# Patient Record
Sex: FEMALE | Race: WHITE | NOT HISPANIC OR LATINO | Employment: FULL TIME | URBAN - METROPOLITAN AREA
[De-identification: names, ages, dates, MRNs, and addresses within clinical notes are randomized per-mention and may not be internally consistent; named-entity substitution may affect disease eponyms.]

---

## 2020-11-30 ENCOUNTER — TRANSCRIBE ORDERS (OUTPATIENT)
Dept: ADMINISTRATIVE | Facility: HOSPITAL | Age: 70
End: 2020-11-30

## 2020-11-30 DIAGNOSIS — Z78.0 POST-MENOPAUSAL: ICD-10-CM

## 2020-11-30 DIAGNOSIS — Z12.31 ENCOUNTER FOR SCREENING MAMMOGRAM FOR MALIGNANT NEOPLASM OF BREAST: Primary | ICD-10-CM

## 2020-12-07 ENCOUNTER — HOSPITAL ENCOUNTER (OUTPATIENT)
Dept: RADIOLOGY | Facility: CLINIC | Age: 70
Discharge: HOME | End: 2020-12-07
Payer: COMMERCIAL

## 2020-12-07 VITALS — BODY MASS INDEX: 30.82 KG/M2 | HEIGHT: 60 IN | WEIGHT: 157 LBS

## 2020-12-07 DIAGNOSIS — Z78.0 POST-MENOPAUSAL: ICD-10-CM

## 2020-12-07 DIAGNOSIS — Z12.31 ENCOUNTER FOR SCREENING MAMMOGRAM FOR MALIGNANT NEOPLASM OF BREAST: ICD-10-CM

## 2020-12-07 PROCEDURE — 77063 BREAST TOMOSYNTHESIS BI: CPT

## 2020-12-07 PROCEDURE — 77067 SCR MAMMO BI INCL CAD: CPT

## 2020-12-07 PROCEDURE — 77080 DXA BONE DENSITY AXIAL: CPT

## 2022-02-11 ENCOUNTER — HOSPITAL ENCOUNTER (OUTPATIENT)
Dept: RADIOLOGY | Facility: CLINIC | Age: 72
Discharge: HOME | End: 2022-02-11
Payer: COMMERCIAL

## 2022-02-11 VITALS — HEIGHT: 60 IN | WEIGHT: 152 LBS | BODY MASS INDEX: 29.84 KG/M2

## 2022-02-11 DIAGNOSIS — Z12.31 ENCOUNTER FOR SCREENING MAMMOGRAM FOR MALIGNANT NEOPLASM OF BREAST: ICD-10-CM

## 2022-02-11 PROCEDURE — 77067 SCR MAMMO BI INCL CAD: CPT

## 2022-02-11 PROCEDURE — 77063 BREAST TOMOSYNTHESIS BI: CPT

## 2022-02-15 DIAGNOSIS — Z12.31 ENCOUNTER FOR SCREENING MAMMOGRAM FOR MALIGNANT NEOPLASM OF BREAST: ICD-10-CM

## 2022-08-01 ENCOUNTER — HOSPITAL ENCOUNTER (OUTPATIENT)
Dept: RADIOLOGY | Facility: CLINIC | Age: 72
Discharge: HOME | End: 2022-08-01
Payer: COMMERCIAL

## 2022-08-01 DIAGNOSIS — R60.0 LOCALIZED EDEMA: ICD-10-CM

## 2022-08-01 DIAGNOSIS — R06.02 SOB (SHORTNESS OF BREATH): ICD-10-CM

## 2022-08-01 DIAGNOSIS — R00.0 TACHYCARDIA: ICD-10-CM

## 2022-08-01 PROCEDURE — 71046 X-RAY EXAM CHEST 2 VIEWS: CPT

## 2022-08-11 ENCOUNTER — HOSPITAL ENCOUNTER (INPATIENT)
Facility: HOSPITAL | Age: 72
LOS: 3 days | Discharge: ANOTHER HOSPITAL | DRG: 291 | End: 2022-08-14
Attending: EMERGENCY MEDICINE | Admitting: FAMILY MEDICINE
Payer: COMMERCIAL

## 2022-08-11 ENCOUNTER — APPOINTMENT (INPATIENT)
Dept: CT IMAGING | Facility: HOSPITAL | Age: 72
DRG: 291 | End: 2022-08-11
Payer: COMMERCIAL

## 2022-08-11 ENCOUNTER — APPOINTMENT (EMERGENCY)
Dept: NON INVASIVE DIAGNOSTICS | Facility: HOSPITAL | Age: 72
DRG: 291 | End: 2022-08-11
Payer: COMMERCIAL

## 2022-08-11 ENCOUNTER — APPOINTMENT (EMERGENCY)
Dept: RADIOLOGY | Facility: HOSPITAL | Age: 72
DRG: 291 | End: 2022-08-11
Payer: COMMERCIAL

## 2022-08-11 DIAGNOSIS — I50.9 CONGESTIVE HEART FAILURE (CHF) (HCC): ICD-10-CM

## 2022-08-11 DIAGNOSIS — I50.9 HEART FAILURE (HCC): Primary | ICD-10-CM

## 2022-08-11 PROBLEM — I50.41 ACUTE COMBINED SYSTOLIC AND DIASTOLIC CONGESTIVE HEART FAILURE (HCC): Status: ACTIVE | Noted: 2022-08-11

## 2022-08-11 PROBLEM — Z72.0 TOBACCO ABUSE: Status: ACTIVE | Noted: 2022-08-11

## 2022-08-11 LAB
2HR DELTA HS TROPONIN: -1 NG/L
4HR DELTA HS TROPONIN: 0 NG/L
ALBUMIN SERPL BCP-MCNC: 3.3 G/DL (ref 3.5–5)
ALP SERPL-CCNC: 85 U/L (ref 46–116)
ALT SERPL W P-5'-P-CCNC: 40 U/L (ref 12–78)
ANION GAP SERPL CALCULATED.3IONS-SCNC: 11 MMOL/L (ref 4–13)
AORTIC ROOT: 3.3 CM
AORTIC VALVE MEAN VELOCITY: 4.2 M/S
APICAL FOUR CHAMBER EJECTION FRACTION: 17 %
ASCENDING AORTA: 3.1 CM
AST SERPL W P-5'-P-CCNC: 21 U/L (ref 5–45)
AV LVOT MEAN GRADIENT: 0 MMHG
AV LVOT PEAK GRADIENT: 1 MMHG
AV MEAN GRADIENT: 1 MMHG
AV PEAK GRADIENT: 2 MMHG
AV VELOCITY RATIO: 0.68
BASOPHILS # BLD AUTO: 0.06 THOUSANDS/ΜL (ref 0–0.1)
BASOPHILS NFR BLD AUTO: 1 % (ref 0–1)
BILIRUB SERPL-MCNC: 0.68 MG/DL (ref 0.2–1)
BUN SERPL-MCNC: 14 MG/DL (ref 5–25)
CALCIUM ALBUM COR SERPL-MCNC: 9.2 MG/DL (ref 8.3–10.1)
CALCIUM SERPL-MCNC: 8.6 MG/DL (ref 8.3–10.1)
CARDIAC TROPONIN I PNL SERPL HS: 16 NG/L
CARDIAC TROPONIN I PNL SERPL HS: 17 NG/L
CARDIAC TROPONIN I PNL SERPL HS: 17 NG/L
CHLORIDE SERPL-SCNC: 101 MMOL/L (ref 96–108)
CO2 SERPL-SCNC: 26 MMOL/L (ref 21–32)
CREAT SERPL-MCNC: 0.67 MG/DL (ref 0.6–1.3)
D DIMER PPP FEU-MCNC: 1.43 UG/ML FEU
DOP CALC AO PEAK VEL: 0.73 M/S
DOP CALC AO VTI: 9.69 CM
DOP CALC LVOT PEAK VEL VTI: 6.55 CM
DOP CALC LVOT PEAK VEL: 0.5 M/S
EOSINOPHIL # BLD AUTO: 0.14 THOUSAND/ΜL (ref 0–0.61)
EOSINOPHIL NFR BLD AUTO: 2 % (ref 0–6)
ERYTHROCYTE [DISTWIDTH] IN BLOOD BY AUTOMATED COUNT: 15.9 % (ref 11.6–15.1)
FRACTIONAL SHORTENING: 4 (ref 28–44)
GFR SERPL CREATININE-BSD FRML MDRD: 89 ML/MIN/1.73SQ M
GLUCOSE SERPL-MCNC: 103 MG/DL (ref 65–140)
HCT VFR BLD AUTO: 40.3 % (ref 34.8–46.1)
HGB BLD-MCNC: 12.7 G/DL (ref 11.5–15.4)
IMM GRANULOCYTES # BLD AUTO: 0.02 THOUSAND/UL (ref 0–0.2)
IMM GRANULOCYTES NFR BLD AUTO: 0 % (ref 0–2)
INTERVENTRICULAR SEPTUM IN DIASTOLE (PARASTERNAL SHORT AXIS VIEW): 0.8 CM
INTERVENTRICULAR SEPTUM: 0.8 CM (ref 0.6–1.1)
LAAS-AP2: 25.6 CM2
LAAS-AP4: 28.8 CM2
LEFT ATRIUM SIZE: 4.2 CM
LEFT INTERNAL DIMENSION IN SYSTOLE: 5.5 CM (ref 2.1–4)
LEFT VENTRICLE DIASTOLIC VOLUME (MOD BIPLANE): 162 ML
LEFT VENTRICLE SYSTOLIC VOLUME (MOD BIPLANE): 130 ML
LEFT VENTRICULAR INTERNAL DIMENSION IN DIASTOLE: 5.7 CM (ref 3.5–6)
LEFT VENTRICULAR POSTERIOR WALL IN END DIASTOLE: 0.9 CM
LEFT VENTRICULAR STROKE VOLUME: 13 ML
LV EF: 20 %
LVSV (TEICH): 13 ML
LYMPHOCYTES # BLD AUTO: 1.22 THOUSANDS/ΜL (ref 0.6–4.47)
LYMPHOCYTES NFR BLD AUTO: 18 % (ref 14–44)
MCH RBC QN AUTO: 27.4 PG (ref 26.8–34.3)
MCHC RBC AUTO-ENTMCNC: 31.5 G/DL (ref 31.4–37.4)
MCV RBC AUTO: 87 FL (ref 82–98)
MONOCYTES # BLD AUTO: 0.88 THOUSAND/ΜL (ref 0.17–1.22)
MONOCYTES NFR BLD AUTO: 13 % (ref 4–12)
MV E'TISSUE VEL-LAT: 5 CM/S
MV E'TISSUE VEL-SEP: 4 CM/S
MV STENOSIS PRESSURE HALF TIME: 38 MS
MV VALVE AREA P 1/2 METHOD: 5.79
NEUTROPHILS # BLD AUTO: 4.62 THOUSANDS/ΜL (ref 1.85–7.62)
NEUTS SEG NFR BLD AUTO: 66 % (ref 43–75)
NRBC BLD AUTO-RTO: 0 /100 WBCS
NT-PROBNP SERPL-MCNC: 7131 PG/ML
PISA MRMAX VEL: 0.29 M/S
PISA RADIUS: 0.9 CM
PLATELET # BLD AUTO: 262 THOUSANDS/UL (ref 149–390)
PLATELET # BLD AUTO: 270 THOUSANDS/UL (ref 149–390)
PMV BLD AUTO: 9.4 FL (ref 8.9–12.7)
PMV BLD AUTO: 9.5 FL (ref 8.9–12.7)
POTASSIUM SERPL-SCNC: 4.2 MMOL/L (ref 3.5–5.3)
PROT SERPL-MCNC: 6.6 G/DL (ref 6.4–8.4)
RBC # BLD AUTO: 4.64 MILLION/UL (ref 3.81–5.12)
SL CV LEFT ATRIUM LENGTH A2C: 5.8 CM
SL CV PED ECHO LEFT VENTRICLE DIASTOLIC VOLUME (MOD BIPLANE) 2D: 158 ML
SL CV PED ECHO LEFT VENTRICLE SYSTOLIC VOLUME (MOD BIPLANE) 2D: 146 ML
SODIUM SERPL-SCNC: 138 MMOL/L (ref 135–147)
TR MAX PG: 15 MMHG
TR PEAK VELOCITY: 1.9 M/S
TRICUSPID VALVE PEAK REGURGITATION VELOCITY: 1.92 M/S
WBC # BLD AUTO: 6.94 THOUSAND/UL (ref 4.31–10.16)

## 2022-08-11 PROCEDURE — 71275 CT ANGIOGRAPHY CHEST: CPT

## 2022-08-11 PROCEDURE — 93005 ELECTROCARDIOGRAM TRACING: CPT

## 2022-08-11 PROCEDURE — 36415 COLL VENOUS BLD VENIPUNCTURE: CPT | Performed by: EMERGENCY MEDICINE

## 2022-08-11 PROCEDURE — 85379 FIBRIN DEGRADATION QUANT: CPT | Performed by: EMERGENCY MEDICINE

## 2022-08-11 PROCEDURE — 71045 X-RAY EXAM CHEST 1 VIEW: CPT

## 2022-08-11 PROCEDURE — 99222 1ST HOSP IP/OBS MODERATE 55: CPT | Performed by: FAMILY MEDICINE

## 2022-08-11 PROCEDURE — 84484 ASSAY OF TROPONIN QUANT: CPT | Performed by: EMERGENCY MEDICINE

## 2022-08-11 PROCEDURE — 83880 ASSAY OF NATRIURETIC PEPTIDE: CPT | Performed by: EMERGENCY MEDICINE

## 2022-08-11 PROCEDURE — 93306 TTE W/DOPPLER COMPLETE: CPT

## 2022-08-11 PROCEDURE — 99285 EMERGENCY DEPT VISIT HI MDM: CPT | Performed by: EMERGENCY MEDICINE

## 2022-08-11 PROCEDURE — 85049 AUTOMATED PLATELET COUNT: CPT | Performed by: FAMILY MEDICINE

## 2022-08-11 PROCEDURE — G1004 CDSM NDSC: HCPCS

## 2022-08-11 PROCEDURE — 80053 COMPREHEN METABOLIC PANEL: CPT | Performed by: EMERGENCY MEDICINE

## 2022-08-11 PROCEDURE — 85025 COMPLETE CBC W/AUTO DIFF WBC: CPT | Performed by: EMERGENCY MEDICINE

## 2022-08-11 PROCEDURE — 99285 EMERGENCY DEPT VISIT HI MDM: CPT

## 2022-08-11 RX ORDER — ASPIRIN 81 MG/1
81 TABLET ORAL DAILY
Status: DISCONTINUED | OUTPATIENT
Start: 2022-08-12 | End: 2022-08-14 | Stop reason: HOSPADM

## 2022-08-11 RX ORDER — NICOTINE 21 MG/24HR
14 PATCH, TRANSDERMAL 24 HOURS TRANSDERMAL DAILY
Status: DISCONTINUED | OUTPATIENT
Start: 2022-08-12 | End: 2022-08-14 | Stop reason: HOSPADM

## 2022-08-11 RX ORDER — METOPROLOL SUCCINATE 25 MG/1
25 TABLET, EXTENDED RELEASE ORAL DAILY
Status: DISCONTINUED | OUTPATIENT
Start: 2022-08-11 | End: 2022-08-14 | Stop reason: HOSPADM

## 2022-08-11 RX ORDER — POTASSIUM CHLORIDE 20 MEQ/1
20 TABLET, EXTENDED RELEASE ORAL DAILY
Status: DISCONTINUED | OUTPATIENT
Start: 2022-08-11 | End: 2022-08-14

## 2022-08-11 RX ORDER — ACETAMINOPHEN 325 MG/1
650 TABLET ORAL EVERY 4 HOURS PRN
Status: DISCONTINUED | OUTPATIENT
Start: 2022-08-11 | End: 2022-08-14 | Stop reason: HOSPADM

## 2022-08-11 RX ORDER — FUROSEMIDE 10 MG/ML
40 INJECTION INTRAMUSCULAR; INTRAVENOUS
Status: DISCONTINUED | OUTPATIENT
Start: 2022-08-11 | End: 2022-08-12

## 2022-08-11 RX ORDER — ENOXAPARIN SODIUM 100 MG/ML
40 INJECTION SUBCUTANEOUS DAILY
Status: DISCONTINUED | OUTPATIENT
Start: 2022-08-12 | End: 2022-08-14 | Stop reason: HOSPADM

## 2022-08-11 RX ORDER — ASPIRIN 81 MG/1
162 TABLET, CHEWABLE ORAL ONCE
Status: COMPLETED | OUTPATIENT
Start: 2022-08-11 | End: 2022-08-11

## 2022-08-11 RX ADMIN — METOPROLOL SUCCINATE 25 MG: 25 TABLET, EXTENDED RELEASE ORAL at 15:51

## 2022-08-11 RX ADMIN — ASPIRIN 81 MG CHEWABLE TABLET 162 MG: 81 TABLET CHEWABLE at 15:51

## 2022-08-11 RX ADMIN — POTASSIUM CHLORIDE 20 MEQ: 1500 TABLET, EXTENDED RELEASE ORAL at 15:52

## 2022-08-11 RX ADMIN — IOHEXOL 74 ML: 350 INJECTION, SOLUTION INTRAVENOUS at 16:24

## 2022-08-11 RX ADMIN — FUROSEMIDE 40 MG: 10 INJECTION, SOLUTION INTRAMUSCULAR; INTRAVENOUS at 15:52

## 2022-08-11 NOTE — PLAN OF CARE
Problem: Potential for Falls  Goal: Patient will remain free of falls  Description: INTERVENTIONS:  - Educate patient/family on patient safety including physical limitations  - Instruct patient to call for assistance with activity   - Consult OT/PT to assist with strengthening/mobility   - Keep Call bell within reach  - Keep bed low and locked with side rails adjusted as appropriate  - Keep care items and personal belongings within reach  - Initiate and maintain comfort rounds  - Make Fall Risk Sign visible to staff  - Offer Toileting every 2 Hours, in advance of need  - Initiate/Maintain bed alarm  - Obtain necessary fall risk management equipment: q shift   - Apply yellow socks and bracelet for high fall risk patients  - Consider moving patient to room near nurses station  Outcome: Progressing     Problem: PAIN - ADULT  Goal: Verbalizes/displays adequate comfort level or baseline comfort level  Description: Interventions:  - Encourage patient to monitor pain and request assistance  - Assess pain using appropriate pain scale  - Administer analgesics based on type and severity of pain and evaluate response  - Implement non-pharmacological measures as appropriate and evaluate response  - Consider cultural and social influences on pain and pain management  - Notify physician/advanced practitioner if interventions unsuccessful or patient reports new pain  Outcome: Progressing     Problem: INFECTION - ADULT  Goal: Absence or prevention of progression during hospitalization  Description: INTERVENTIONS:  - Assess and monitor for signs and symptoms of infection  - Monitor lab/diagnostic results  - Monitor all insertion sites, i e  indwelling lines, tubes, and drains  - Monitor endotracheal if appropriate and nasal secretions for changes in amount and color  - Dawson appropriate cooling/warming therapies per order  - Administer medications as ordered  - Instruct and encourage patient and family to use good hand hygiene technique  - Identify and instruct in appropriate isolation precautions for identified infection/condition  Outcome: Progressing  Goal: Absence of fever/infection during neutropenic period  Description: INTERVENTIONS:  - Monitor WBC    Outcome: Progressing     Problem: SAFETY ADULT  Goal: Patient will remain free of falls  Description: INTERVENTIONS:  - Educate patient/family on patient safety including physical limitations  - Instruct patient to call for assistance with activity   - Consult OT/PT to assist with strengthening/mobility   - Keep Call bell within reach  - Keep bed low and locked with side rails adjusted as appropriate  - Keep care items and personal belongings within reach  - Initiate and maintain comfort rounds  - Make Fall Risk Sign visible to staff  - Offer Toileting every 2 Hours, in advance of need  - Initiate/Maintain bed alarm  - Obtain necessary fall risk management equipment: q shift   - Apply yellow socks and bracelet for high fall risk patients  - Consider moving patient to room near nurses station  Outcome: Progressing  Goal: Maintain or return to baseline ADL function  Description: INTERVENTIONS:  -  Assess patient's ability to carry out ADLs; assess patient's baseline for ADL function and identify physical deficits which impact ability to perform ADLs (bathing, care of mouth/teeth, toileting, grooming, dressing, etc )  - Assess/evaluate cause of self-care deficits   - Assess range of motion  - Assess patient's mobility; develop plan if impaired  - Assess patient's need for assistive devices and provide as appropriate  - Encourage maximum independence but intervene and supervise when necessary  - Involve family in performance of ADLs  - Assess for home care needs following discharge   - Consider OT consult to assist with ADL evaluation and planning for discharge  - Provide patient education as appropriate  Outcome: Progressing  Goal: Maintains/Returns to pre admission functional level  Description: INTERVENTIONS:  - Perform BMAT or MOVE assessment daily    - Set and communicate daily mobility goal to care team and patient/family/caregiver  - Collaborate with rehabilitation services on mobility goals if consulted  - Perform Range of Motion 4 times a day  - Reposition patient every 2 hours  - Dangle patient 3 times a day  - Stand patient 3 times a day  - Ambulate patient 3 times a day  - Out of bed to chair 3 times a day   - Out of bed for meals 3 times a day  - Out of bed for toileting  - Record patient progress and toleration of activity level   Outcome: Progressing     Problem: DISCHARGE PLANNING  Goal: Discharge to home or other facility with appropriate resources  Description: INTERVENTIONS:  - Identify barriers to discharge w/patient and caregiver  - Arrange for needed discharge resources and transportation as appropriate  - Identify discharge learning needs (meds, wound care, etc )  - Arrange for interpretive services to assist at discharge as needed  - Refer to Case Management Department for coordinating discharge planning if the patient needs post-hospital services based on physician/advanced practitioner order or complex needs related to functional status, cognitive ability, or social support system  Outcome: Progressing     Problem: Knowledge Deficit  Goal: Patient/family/caregiver demonstrates understanding of disease process, treatment plan, medications, and discharge instructions  Description: Complete learning assessment and assess knowledge base    Interventions:  - Provide teaching at level of understanding  - Provide teaching via preferred learning methods  Outcome: Progressing     Problem: CARDIOVASCULAR - ADULT  Goal: Maintains optimal cardiac output and hemodynamic stability  Description: INTERVENTIONS:  - Monitor I/O, vital signs and rhythm  - Monitor for S/S and trends of decreased cardiac output  - Administer and titrate ordered vasoactive medications to optimize hemodynamic stability  - Assess quality of pulses, skin color and temperature  - Assess for signs of decreased coronary artery perfusion  - Instruct patient to report change in severity of symptoms  Outcome: Progressing  Goal: Absence of cardiac dysrhythmias or at baseline rhythm  Description: INTERVENTIONS:  - Continuous cardiac monitoring, vital signs, obtain 12 lead EKG if ordered  - Administer antiarrhythmic and heart rate control medications as ordered  - Monitor electrolytes and administer replacement therapy as ordered  Outcome: Progressing     Problem: RESPIRATORY - ADULT  Goal: Achieves optimal ventilation and oxygenation  Description: INTERVENTIONS:  - Assess for changes in respiratory status  - Assess for changes in mentation and behavior  - Position to facilitate oxygenation and minimize respiratory effort  - Oxygen administered by appropriate delivery if ordered  - Initiate smoking cessation education as indicated  - Encourage broncho-pulmonary hygiene including cough, deep breathe, Incentive Spirometry  - Assess the need for suctioning and aspirate as needed  - Assess and instruct to report SOB or any respiratory difficulty  - Respiratory Therapy support as indicated  Outcome: Progressing

## 2022-08-11 NOTE — H&P
[address]  H&P- Scott Moots 1950, 81 y o  female MRN: [id number]  Unit/Bed#: -[facility] Encounter: [id number]  Primary Care Provider: Philippe Jacques PA-C   Date and time admitted to hospital: 8/11/2022  2:44 PM    * Acute combined systolic and diastolic congestive heart failure ([address] )  Assessment & Plan  Wt Readings from Last 3 Encounters:   08/11/22 65 8 kg (145 lb 1 oz)   02/11/22 68 9 kg (152 lb)   12/07/20 71 2 kg (157 lb)       · Shortness of breath has been worsening, orthopnea worsening lower extremity edema Lasix 20 mg outpatient that was recently started not help  Severe cardiomyopathy on the echo done bedside and read officially with right-sided the heart failure as well-Left Ventricle: Normal left ventricular wall thickness with enlarged LV cavity size and severely reduced LV systolic function  Estimated ejection fraction 10-15% with severe global hypokinesis  Abnormal diastolic relaxation pattern suggestive of restrictive inflow  · Will place on telemetry monitor  · Troponins are negative no chest pain  · Evaluated by Cardiology medical therapy has been started with aspirin and Toprol Lasix 40 mg IV b i d   · Patient will need cardiac catheterization after diuresis prefers to be transferred to [facility] anticipate Monday  · One thousand five hundred fluid restricted strict I and o and daily weight      Tobacco abuse  Assessment & Plan  · Just quit place on nicotine patch    VTE Pharmacologic Prophylaxis: VTE Score: 4 Moderate Risk (Score 3-4) - Pharmacological DVT Prophylaxis Ordered: enoxaparin (Lovenox)  Code Status: Level 1 - Full Code   Discussion with family: Updated  (sister) at bedside  Anticipated Length of Stay: Patient will be admitted on an inpatient basis with an anticipated length of stay of greater than 2 midnights secondary to New onset CHF exacerbation      Total Time for Visit, including Counseling / Coordination of Care: 60 minutes Greater than 50% of this total time spent on direct patient counseling and coordination of care  Chief Complaint: sob     History of Present Illness: Zarina Phoenix is a 81 y o  female with a PMH of tobacco abuse who presents with sob x1 year , sob worse for 3 weeks exertion, leg swelling has been given lasix 20 mg tabs by pcp first week of august, no change  Orthopnea , positive pnd  Drinking a lot of water diet ok not l;ots of salt  Cough gone  No chest pain  Quit smoking July 31, smoked 1/2-1ppd  No gi symptoms  Review of Systems:  Review of Systems   Constitutional: Negative for chills and fever  HENT: Negative for ear pain and sore throat  Eyes: Negative for pain and visual disturbance  Respiratory: Positive for shortness of breath  Negative for cough  Cardiovascular: Positive for leg swelling  Negative for chest pain and palpitations  Gastrointestinal: Negative for abdominal pain and vomiting  Genitourinary: Negative for dysuria and hematuria  Musculoskeletal: Negative for arthralgias and back pain  Skin: Negative for color change and rash  Neurological: Negative for seizures and syncope  All other systems reviewed and are negative  Past Medical and Surgical History:   Past Medical History:   Diagnosis Date    Basal cell carcinoma (BCC) of face     age 74    [address]F (congestive heart failure) ([facility] [address] )        Past Surgical History:   Procedure Laterality Date    OOPHORECTOMY Right     2009       Meds/Allergies:  Prior to Admission medications    Not on File     I have reviewed home medications with patient personally  Allergies:    Allergies   Allergen Reactions    Atorvastatin Other (See Comments)     Tired, muscle pain, "trouble breathing"        Social History:  Marital Status:    Substance Use History:   Social History     Substance and Sexual Activity   Alcohol Use Not Currently     Social History     Tobacco Use   Smoking Status Former Smoker    Packs/day: 1 00  Years: 40 00    Pack years: 40 00    Quit date: 2022    Years since quittin 0   Smokeless Tobacco Never Used     Social History     Substance and Sexual Activity   Drug Use Never       Family History:  Family History   Problem Relation Age of Onset    No Known Problems Mother     No Known Problems Father     No Known Problems Daughter     No Known Problems Maternal Grandmother     No Known Problems Maternal Grandfather     No Known Problems Paternal Grandmother     No Known Problems Paternal Grandfather     No Known Problems Half-Sister     No Known Problems Half-Sister     No Known Problems Maternal Aunt     No Known Problems Maternal Aunt     No Known Problems Paternal Aunt     No Known Problems Paternal Aunt        Physical Exam:     Vitals:   Blood Pressure: 122/88 (22 1641)  Pulse: 98 (22 1641)  Temperature: 97 5 °F (36 4 °C) (22 1641)  Temp Source: Temporal (22 1437)  Respirations: (!) 28 (22 1545)  Height: 5' 1" (154 9 cm) (22 1636)  Weight - Scale: 65 8 kg (145 lb 1 oz) (22 1636)  SpO2: 96 % (22 1641)    Physical Exam  Vitals and nursing note reviewed  Constitutional:       General: She is not in acute distress  Appearance: She is well-developed  HENT:      Head: Normocephalic and atraumatic  Eyes:      Conjunctiva/sclera: Conjunctivae normal    Cardiovascular:      Rate and Rhythm: Normal rate and regular rhythm  Heart sounds: No murmur heard  Pulmonary:      Effort: Pulmonary effort is normal  No respiratory distress  Breath sounds: Rales present  Abdominal:      General: There is no distension  Palpations: Abdomen is soft  Tenderness: There is no abdominal tenderness  Musculoskeletal:         General: Swelling present  Cervical back: Neck supple  Skin:     General: Skin is warm and dry  Neurological:      Mental Status: She is alert and oriented to person, place, and time  Additional Data:     Lab Results:  Results from last 7 days   Lab Units 08/11/22  1459   WBC Thousand/uL 6 94   HEMOGLOBIN g/dL 12 7   HEMATOCRIT % 40 3   PLATELETS Thousands/uL 262   NEUTROS PCT % 66   LYMPHS PCT % 18   MONOS PCT % 13*   EOS PCT % 2     Results from last 7 days   Lab Units 08/11/22  1459   SODIUM mmol/L 138   POTASSIUM mmol/L 4 2   CHLORIDE mmol/L 101   CO2 mmol/L 26   BUN mg/dL 14   CREATININE mg/dL 0 67   ANION GAP mmol/L 11   CALCIUM mg/dL 8 6   ALBUMIN g/dL 3 3*   TOTAL BILIRUBIN mg/dL 0 68   ALK PHOS U/L 85   ALT U/L 40   AST U/L 21   GLUCOSE RANDOM mg/dL 103                       Imaging: Reviewed radiology reports from this admission including: chest xray and chest CT scan  CTA ED chest PE Study   Final Result by Александр Olmos MD (08/11 1650)      No pulmonary embolus  Mild interstitial edema with trace right effusion  Mild emphysema  Cardiomegaly  Workstation performed: [id number]         XR chest 1 view portable    (Results Pending)   VAS lower limb venous duplex study, complete bilateral    (Results Pending)       EKG and Other Studies Reviewed on Admission:   · EKG: No EKG obtained  will obtain    ** Please Note: This note has been constructed using a voice recognition system   **

## 2022-08-11 NOTE — CONSULTS
Consultation - Cardiology   Jonathan Lefort 81 y o  female MRN: [id number]  Unit/Bed#: ED 03 Encounter: [id number]    Assessment/Plan     Assessment:  Acute on chronic HFrEF- visually based on echo at bedside, not officially read, edema, sob  Cardiomyopathy- unknown etiology  Smoking- 40 pack year history but quit a few weeks ago     Plan:  1  Start lasix 40 IV BID  2  Monitor I and O, daily standing weights, renal function and electrolytes  3  Recommend diuresis first and then would recommend transfer to [facility] for [address]  4  Will formally read echocardiogram    5  Start asa 81 mg daily  6  Start Toprol xl 25 mg daily  7  Will eventually recommend statin  8  Pt has a wedding tomorrow  She wants to leave tomorrow knowing this is against medical advice and then would like to come back  Would recommend diuresis through the weekend and then transfer to [facility] for [address]  Will contact [facility] to let them know  History of Present Illness   Physician Requesting Consult: Milton Merritt MD  Reason for Consult / Principal Problem: CHFrEF  HPI: Jonathan Lefort is a 81y o  year old female with history of smoking is here for concerns with sob and edema  Recently saw PCP at the beginning of august for these concerns  She eventually was placed on lasix and potassium on 8/2/22 as well as an inhaler  She states this did help her symptoms but her symptoms did not resolve  She states her breathing has been difficult for "a long time", likely years  She states this got more significant recently  She does not feel sob resting but does notice SOB with just speaking  She has not noticed fevers or chills  She states she "was coughing" but hasnt because she quit on July 31st  She states she was smoking a pack a day for 40 years  Throughout this episode she has not had any chest pain  She states that she does not regulalry see a doctor but did get regular physicals  She reports no change in appetite and no weight change lately       At the time of seeing her PCP her BNP was 9521  She works at [employer] and has been taking days off since her symptoms started  She states she cannot stay in the hospital because her daughter is getting   Reports no other drug use or alcohol use  In the past hasnt tolerated statin  Consults    Review of Systems   Constitutional: Positive for fatigue  Negative for chills, diaphoresis and unexpected weight change  Respiratory: Positive for cough and shortness of breath  Negative for chest tightness  Cardiovascular: Positive for leg swelling  Negative for chest pain and palpitations  Gastrointestinal: Negative for nausea  Musculoskeletal: Negative for arthralgias  Skin: Negative for color change, pallor and rash  Neurological: Positive for weakness  Negative for dizziness and light-headedness  Psychiatric/Behavioral: Negative for agitation, behavioral problems and confusion  Historical Information   Past Medical History:   Diagnosis Date    Basal cell carcinoma (BCC) of face     age 74    CHF (congestive heart failure) ([facility])      Past Surgical History:   Procedure Laterality Date    OOPHORECTOMY Right     2009     Social History     Substance and Sexual Activity   Alcohol Use Not Currently     Social History     Substance and Sexual Activity   Drug Use Never     E-Cigarette/Vaping     E-Cigarette/Vaping Substances     Social History     Tobacco Use   Smoking Status Former Smoker    Packs/day: 1 00    Years: 40 00    Pack years: 40 00    Quit date: 2022    Years since quittin 0   Smokeless Tobacco Never Used     Family History: non-contributory    Meds/Allergies   all current active meds have been reviewed  Allergies   Allergen Reactions    Atorvastatin Other (See Comments)     Tired, muscle pain, "trouble breathing"        Objective   Vitals: Blood pressure 126/82, pulse 99, temperature 97 5 °F (36 4 °C), temperature source Temporal, resp   rate 18, weight 65 8 kg (145 lb), SpO2 95 %  Orthostatic Blood Pressures    Flowsheet Row Most Recent Value   Blood Pressure 126/82 filed at 08/11/2022 1500   Patient Position - Orthostatic VS Lying filed at 08/11/2022 1500          No intake or output data in the 24 hours ending 08/11/22 1513    Invasive Devices  Report    Peripheral Intravenous Line  Duration           Peripheral IV 08/11/22 Left Antecubital <1 day                Physical Exam  Constitutional:       Appearance: Normal appearance  HENT:      Head: Normocephalic and atraumatic  Cardiovascular:      Rate and Rhythm: Normal rate  Heart sounds: No murmur heard  No gallop  Pulmonary:      Effort: Pulmonary effort is normal       Comments: Decreased breath sounds in bases  Abdominal:      Palpations: Abdomen is soft  Musculoskeletal:         General: Swelling present  Cervical back: Neck supple  Skin:     General: Skin is warm and dry  Capillary Refill: Capillary refill takes less than 2 seconds  Neurological:      General: No focal deficit present  Mental Status: She is alert and oriented to person, place, and time  Psychiatric:         Mood and Affect: Mood normal          Thought Content: Thought content normal          Lab Results:   I have personally reviewed pertinent lab results  CBC with diff:   Results from last 7 days   Lab Units 08/11/22  1459   WBC Thousand/uL 6 94   RBC Million/uL 4 64   HEMOGLOBIN g/dL 12 7   HEMATOCRIT % 40 3   MCV fL 87   MCH pg 27 4   MCHC g/dL 31 5   RDW % 15 9*   MPV fL 9 4   PLATELETS Thousands/uL 262     CMP:       Invalid input(s): ALBUMIN  HS Troponin: No results found for: HSTNI, HSTNI0, HSTNI2, HSTNI4  BNP:     Coags:     TSH:     Magnesium:     Lipid Profile:     Imaging: I have personally reviewed pertinent reports  EKG: nsr with LAFB    Estimated LVEF 15% at bedside, awaiting formal reading

## 2022-08-11 NOTE — ED PROVIDER NOTES
History  Chief Complaint   Patient presents with    Shortness of Breath     PT sent from office, provider had her do ambulating SPO2 said it "dropped" but did not tell a number  PT complains of SOB with exertion  History provided by:  Medical records, patient and relative (Sister)  Shortness of Breath  Severity:  Moderate  Onset quality:  Gradual  Timing:  Constant  Progression:  Unchanged  Chronicity:  New  Context comment:  Patient has been dealing with several weeks of dyspnea on exertion and some leg swelling, seen by PCP 1 week ago for similar told she potentially had congestive heart failure, was placed on Lasix and potassium, minimal improvement  Repeat evaluation today  Relieved by:  Nothing  Worsened by:  Exertion  Ineffective treatments:  None tried  Associated symptoms: no abdominal pain, no chest pain, no cough, no ear pain, no fever, no headaches, no rash, no sore throat and no vomiting    Risk factors comment:  Smoker quit 2 weeks ago      None       Past Medical History:   Diagnosis Date    Basal cell carcinoma (BCC) of face     age 74    CHF (congestive heart failure) (HCC)        Past Surgical History:   Procedure Laterality Date    OOPHORECTOMY Right     2009       Family History   Problem Relation Age of Onset    No Known Problems Mother     No Known Problems Father     No Known Problems Daughter     No Known Problems Maternal Grandmother     No Known Problems Maternal Grandfather     No Known Problems Paternal Grandmother     No Known Problems Paternal Grandfather     No Known Problems Half-Sister     No Known Problems Half-Sister     No Known Problems Maternal Aunt     No Known Problems Maternal Aunt     No Known Problems Paternal Aunt     No Known Problems Paternal Aunt      I have reviewed and agree with the history as documented      E-Cigarette/Vaping     E-Cigarette/Vaping Substances     Social History     Tobacco Use    Smoking status: Former Smoker     Packs/day: 1 00     Years: 40 00     Pack years: 40 00     Quit date: 2022     Years since quittin 0    Smokeless tobacco: Never Used   Substance Use Topics    Alcohol use: Not Currently    Drug use: Never       Review of Systems   Constitutional: Negative for chills, fatigue and fever  HENT: Negative for ear discharge, ear pain, rhinorrhea and sore throat  Eyes: Negative for pain and visual disturbance  Respiratory: Positive for shortness of breath  Negative for cough  Cardiovascular: Positive for leg swelling  Negative for chest pain and palpitations  Gastrointestinal: Negative for abdominal pain, diarrhea, nausea and vomiting  Endocrine: Negative for polydipsia, polyphagia and polyuria  Genitourinary: Negative for difficulty urinating, dysuria, flank pain and hematuria  Musculoskeletal: Negative for arthralgias and back pain  Skin: Negative for color change and rash  Allergic/Immunologic: Negative for immunocompromised state  Neurological: Negative for dizziness, seizures, syncope, weakness and headaches  Psychiatric/Behavioral: Negative for confusion and self-injury  The patient is not nervous/anxious  All other systems reviewed and are negative  Physical Exam  Physical Exam  Constitutional:       General: She is not in acute distress  Appearance: Normal appearance  She is not ill-appearing, toxic-appearing or diaphoretic  Comments: Anxious   HENT:      Head: Normocephalic and atraumatic  Nose: Nose normal  No congestion or rhinorrhea  Mouth/Throat:      Mouth: Mucous membranes are moist       Pharynx: Oropharynx is clear  No oropharyngeal exudate or posterior oropharyngeal erythema  Eyes:      General:         Right eye: No discharge  Left eye: No discharge  Cardiovascular:      Rate and Rhythm: Normal rate and regular rhythm  Pulses: Normal pulses  Heart sounds: Normal heart sounds  No murmur heard  No gallop     Pulmonary: Effort: Pulmonary effort is normal  No respiratory distress  Breath sounds: Normal breath sounds  No stridor  No wheezing, rhonchi or rales  Chest:      Chest wall: Edema present  No tenderness  Comments: +2 nonpitting edema to the bilateral ankles  Abdominal:      General: Bowel sounds are normal  There is no distension  Palpations: Abdomen is soft  There is no mass  Tenderness: There is no abdominal tenderness  There is no right CVA tenderness, left CVA tenderness, guarding or rebound  Hernia: No hernia is present  Musculoskeletal:         General: Normal range of motion  Cervical back: Normal range of motion and neck supple  Skin:     General: Skin is warm and dry  Capillary Refill: Capillary refill takes less than 2 seconds  Neurological:      General: No focal deficit present  Mental Status: She is alert and oriented to person, place, and time  Cranial Nerves: No cranial nerve deficit  Sensory: No sensory deficit  Motor: No weakness  Coordination: Coordination normal       Gait: Gait normal       Deep Tendon Reflexes: Reflexes normal    Psychiatric:         Mood and Affect: Mood normal          Behavior: Behavior normal          Thought Content:  Thought content normal          Judgment: Judgment normal          Vital Signs  ED Triage Vitals [08/11/22 1437]   Temperature Pulse Respirations Blood Pressure SpO2   97 5 °F (36 4 °C) 98 (!) 25 119/93 98 %      Temp Source Heart Rate Source Patient Position - Orthostatic VS BP Location FiO2 (%)   Temporal Monitor Sitting Right arm --      Pain Score       No Pain           Vitals:    08/11/22 1437 08/11/22 1500 08/11/22 1545 08/11/22 1641   BP: 119/93 126/82 130/82 122/88   Pulse: 98 99 104 98   Patient Position - Orthostatic VS: Sitting Lying Lying          Visual Acuity  Visual Acuity    Flowsheet Row Most Recent Value   L Pupil Size (mm) 3          ED Medications  Medications   furosemide (LASIX) injection 40 mg (40 mg Intravenous Given 8/11/22 1552)   potassium chloride (K-DUR,KLOR-CON) CR tablet 20 mEq (20 mEq Oral Given 8/11/22 1552)   metoprolol succinate (TOPROL-XL) 24 hr tablet 25 mg (25 mg Oral Given 8/11/22 1551)   aspirin (ECOTRIN LOW STRENGTH) EC tablet 81 mg (has no administration in time range)   enoxaparin (LOVENOX) subcutaneous injection 40 mg (has no administration in time range)   acetaminophen (TYLENOL) tablet 650 mg (has no administration in time range)   nicotine (NICODERM CQ) 14 mg/24hr TD 24 hr patch 14 mg (has no administration in time range)   aspirin chewable tablet 162 mg (162 mg Oral Given 8/11/22 1551)   iohexol (OMNIPAQUE) 350 MG/ML injection (MULTI-DOSE) 74 mL (74 mL Intravenous Given 8/11/22 1624)       Diagnostic Studies  Results Reviewed     Procedure Component Value Units Date/Time    HS Troponin I 2hr [[id number]]  (Normal) Collected: 08/11/22 1703    Lab Status: Final result Specimen: Blood from Arm, Right Updated: 08/11/22 1731     hs TnI 2hr 16 ng/L      Delta 2hr hsTnI -1 ng/L     HS Troponin I 4hr [[id number]]     Lab Status: No result Specimen: Blood     Comprehensive metabolic panel [[id number]]  (Abnormal) Collected: 08/11/22 1459    Lab Status: Final result Specimen: Blood from Arm, Left Updated: 08/11/22 1531     Sodium 138 mmol/L      Potassium 4 2 mmol/L      Chloride 101 mmol/L      CO2 26 mmol/L      ANION GAP 11 mmol/L      BUN 14 mg/dL      Creatinine 0 67 mg/dL      Glucose 103 mg/dL      Calcium 8 6 mg/dL      Corrected Calcium 9 2 mg/dL      AST 21 U/L      ALT 40 U/L      Alkaline Phosphatase 85 U/L      Total Protein 6 6 g/dL      Albumin 3 3 g/dL      Total Bilirubin 0 68 mg/dL      eGFR 89 ml/min/1 73sq m     Narrative:      Nathaniel guidelines for Chronic Kidney Disease (CKD):     Stage 1 with normal or high GFR (GFR > 90 mL/min/1 73 square meters)    Stage 2 Mild CKD (GFR = 60-89 mL/min/1 73 square meters)    Stage 3A Moderate CKD (GFR = 45-59 mL/min/1 73 square meters)    Stage 3B Moderate CKD (GFR = 30-44 mL/min/1 73 square meters)    Stage 4 Severe CKD (GFR = 15-29 mL/min/1 73 square meters)    Stage 5 End Stage CKD (GFR <15 mL/min/1 73 square meters)  Note: GFR calculation is accurate only with a steady state creatinine    NT-BNP PRO [[id number]]  (Abnormal) Collected: 08/11/22 1459    Lab Status: Final result Specimen: Blood from Arm, Left Updated: 08/11/22 1531     NT-proBNP 7,131 pg/mL     HS Troponin 0hr (reflex protocol) [[id number]]  (Normal) Collected: 08/11/22 1459    Lab Status: Final result Specimen: Blood from Arm, Left Updated: 08/11/22 1530     hs TnI 0hr 17 ng/L     D-Dimer [[id number]]  (Abnormal) Collected: 08/11/22 1459    Lab Status: Final result Specimen: Blood from Arm, Left Updated: 08/11/22 1519     D-Dimer, Quant 1 43 ug/ml FEU     Narrative: In the evaluation for possible pulmonary embolism, in the appropriate (Well's Score of 4 or less) patient, the age adjusted d-dimer cutoff for this patient can be calculated as:    Age x 0 01 (in ug/mL) for Age-adjusted D-dimer exclusion threshold for a patient over 50 years      CBC and differential [[id number]]  (Abnormal) Collected: 08/11/22 1459    Lab Status: Final result Specimen: Blood from Arm, Left Updated: 08/11/22 1504     WBC 6 94 Thousand/uL      RBC 4 64 Million/uL      Hemoglobin 12 7 g/dL      Hematocrit 40 3 %      MCV 87 fL      MCH 27 4 pg      MCHC 31 5 g/dL      RDW 15 9 %      MPV 9 4 fL      Platelets 339 Thousands/uL      nRBC 0 /100 WBCs      Neutrophils Relative 66 %      Immat GRANS % 0 %      Lymphocytes Relative 18 %      Monocytes Relative 13 %      Eosinophils Relative 2 %      Basophils Relative 1 %      Neutrophils Absolute 4 62 Thousands/µL      Immature Grans Absolute 0 02 Thousand/uL      Lymphocytes Absolute 1 22 Thousands/µL      Monocytes Absolute 0 88 Thousand/µL      Eosinophils Absolute 0 14 Thousand/µL      Basophils Absolute 0 06 Thousands/µL                  CTA ED chest PE Study   Final Result by Lauren Shields MD (08/11 1650)      No pulmonary embolus  Mild interstitial edema with trace right effusion  Mild emphysema  Cardiomegaly  Workstation performed: [id number]         XR chest 1 view portable    (Results Pending)   VAS lower limb venous duplex study, complete bilateral    (Results Pending)              Procedures  Procedures         ED Course  ED Course as of 08/11/22 1825   u Aug 11, 2022   1445 1445:  Patient appears well, vital signs reviewed  Pre-hospital concerns of heart failure  Patient has no formal diagnosis of heart failure  Patient denies chest pain  EKG shows normal sinus rhythm with left atrial enlargement, poor R-wave progression, nonspecific ST changes  I will complete basic labs including cardiac enzymes, BNP, D-dimer, check chest x-ray and echocardiogram    1503 1503:  Discussed with cardiology for assistance with care  [phone]:  Echocardiogram, chest x-ray and labs reviewed with cardiology  Discussed with medicine for admission  Venous duplex and CT chest ordered, pending  SBIRT 20yo+    Flowsheet Row Most Recent Value   SBIRT (23 yo +)    In order to provide better care to our patients, we are screening all of our patients for alcohol and drug use  Would it be okay to ask you these screening questions? Yes Filed at: 08/11/2022 1505   Initial Alcohol Screen: US AUDIT-C     1  How often do you have a drink containing alcohol? 0 Filed at: 08/11/2022 1505   2  How many drinks containing alcohol do you have on a typical day you are drinking? 0 Filed at: 08/11/2022 1505   3b  FEMALE Any Age, or MALE 65+: How often do you have 4 or more drinks on one occassion? 0 Filed at: 08/11/2022 1505   Audit-C Score 0 Filed at: 08/11/2022 1505   OTTO: How many times in the past year have you        Used an illegal drug or used a prescription medication for non-medical reasons? Never Filed at: 08/11/2022 1505                    MDM    Disposition  Final diagnoses:   Heart failure ([facility] [address] )   Congestive heart failure (CHF) ([facility] [address] )     Time reflects when diagnosis was documented in both MDM as applicable and the Disposition within this note     Time User Action Codes Description Comment    8/11/2022  2:56 PM Danice Space Add [I50 9] Heart failure ([facility] [address] )     8/11/2022  3:35 PM Danice Space Add [I50 9] Congestive heart failure (CHF) [facility])       ED Disposition     ED Disposition   Admit    Condition   Stable    Date/Time   Thu Aug 11, 2022  3:35 PM    Comment              Follow-up Information    None         There are no discharge medications for this patient  No discharge procedures on file      PDMP Review     None          ED Provider  Electronically Signed by           Charis Herrera MD  08/11/22 4021

## 2022-08-11 NOTE — ASSESSMENT & PLAN NOTE
Wt Readings from Last 3 Encounters:   08/11/22 65 8 kg (145 lb 1 oz)   02/11/22 68 9 kg (152 lb)   12/07/20 71 2 kg (157 lb)       · Shortness of breath has been worsening, orthopnea worsening lower extremity edema Lasix 20 mg outpatient that was recently started not help  Severe cardiomyopathy on the echo done bedside and read officially with right-sided the heart failure as well-Left Ventricle: Normal left ventricular wall thickness with enlarged LV cavity size and severely reduced LV systolic function  Estimated ejection fraction 10-15% with severe global hypokinesis  Abnormal diastolic relaxation pattern suggestive of restrictive inflow    · Will place on telemetry monitor  · Troponins are negative no chest pain  · Evaluated by Cardiology medical therapy has been started with aspirin and Toprol Lasix 40 mg IV b i d   · Patient will need cardiac catheterization after diuresis prefers to be transferred to [facility] anticipate Monday  · One thousand five hundred fluid restricted strict I and o and daily weight

## 2022-08-12 ENCOUNTER — APPOINTMENT (INPATIENT)
Dept: NON INVASIVE DIAGNOSTICS | Facility: HOSPITAL | Age: 72
DRG: 291 | End: 2022-08-12
Payer: COMMERCIAL

## 2022-08-12 PROBLEM — E44.0 MODERATE PROTEIN-CALORIE MALNUTRITION (HCC): Status: ACTIVE | Noted: 2022-08-12

## 2022-08-12 LAB
ALBUMIN SERPL BCP-MCNC: 2.9 G/DL (ref 3.5–5)
ALP SERPL-CCNC: 72 U/L (ref 46–116)
ALT SERPL W P-5'-P-CCNC: 26 U/L (ref 12–78)
ANION GAP SERPL CALCULATED.3IONS-SCNC: 8 MMOL/L (ref 4–13)
AST SERPL W P-5'-P-CCNC: 19 U/L (ref 5–45)
ATRIAL RATE: 90 BPM
ATRIAL RATE: 96 BPM
BILIRUB SERPL-MCNC: 0.8 MG/DL (ref 0.2–1)
BUN SERPL-MCNC: 15 MG/DL (ref 5–25)
CALCIUM ALBUM COR SERPL-MCNC: 9 MG/DL (ref 8.3–10.1)
CALCIUM SERPL-MCNC: 8.1 MG/DL (ref 8.3–10.1)
CHLORIDE SERPL-SCNC: 101 MMOL/L (ref 96–108)
CO2 SERPL-SCNC: 27 MMOL/L (ref 21–32)
CREAT SERPL-MCNC: 0.67 MG/DL (ref 0.6–1.3)
GFR SERPL CREATININE-BSD FRML MDRD: 89 ML/MIN/1.73SQ M
GLUCOSE SERPL-MCNC: 92 MG/DL (ref 65–140)
MAGNESIUM SERPL-MCNC: 2 MG/DL (ref 1.6–2.6)
P AXIS: 61 DEGREES
P AXIS: 70 DEGREES
POTASSIUM SERPL-SCNC: 4.2 MMOL/L (ref 3.5–5.3)
PR INTERVAL: 152 MS
PR INTERVAL: 158 MS
PROT SERPL-MCNC: 5.7 G/DL (ref 6.4–8.4)
QRS AXIS: -46 DEGREES
QRS AXIS: -69 DEGREES
QRSD INTERVAL: 82 MS
QRSD INTERVAL: 84 MS
QT INTERVAL: 360 MS
QT INTERVAL: 372 MS
QTC INTERVAL: 454 MS
QTC INTERVAL: 455 MS
SODIUM SERPL-SCNC: 136 MMOL/L (ref 135–147)
T WAVE AXIS: 123 DEGREES
T WAVE AXIS: 127 DEGREES
VENTRICULAR RATE: 90 BPM
VENTRICULAR RATE: 96 BPM

## 2022-08-12 PROCEDURE — 80053 COMPREHEN METABOLIC PANEL: CPT | Performed by: FAMILY MEDICINE

## 2022-08-12 PROCEDURE — 93970 EXTREMITY STUDY: CPT

## 2022-08-12 PROCEDURE — 99232 SBSQ HOSP IP/OBS MODERATE 35: CPT | Performed by: FAMILY MEDICINE

## 2022-08-12 PROCEDURE — 83735 ASSAY OF MAGNESIUM: CPT | Performed by: FAMILY MEDICINE

## 2022-08-12 RX ORDER — MIDODRINE HYDROCHLORIDE 5 MG/1
2.5 TABLET ORAL 2 TIMES DAILY PRN
Status: DISCONTINUED | OUTPATIENT
Start: 2022-08-12 | End: 2022-08-14 | Stop reason: HOSPADM

## 2022-08-12 RX ORDER — FUROSEMIDE 10 MG/ML
40 INJECTION INTRAMUSCULAR; INTRAVENOUS
Status: DISCONTINUED | OUTPATIENT
Start: 2022-08-13 | End: 2022-08-14

## 2022-08-12 RX ORDER — ROSUVASTATIN CALCIUM 10 MG/1
10 TABLET, COATED ORAL
Status: DISCONTINUED | OUTPATIENT
Start: 2022-08-13 | End: 2022-08-14 | Stop reason: HOSPADM

## 2022-08-12 RX ADMIN — ASPIRIN 81 MG: 81 TABLET, COATED ORAL at 08:13

## 2022-08-12 RX ADMIN — METOPROLOL SUCCINATE 25 MG: 25 TABLET, EXTENDED RELEASE ORAL at 08:13

## 2022-08-12 RX ADMIN — ENOXAPARIN SODIUM 40 MG: 40 INJECTION SUBCUTANEOUS at 08:24

## 2022-08-12 RX ADMIN — FUROSEMIDE 40 MG: 10 INJECTION, SOLUTION INTRAMUSCULAR; INTRAVENOUS at 08:13

## 2022-08-12 RX ADMIN — MIDODRINE HYDROCHLORIDE 2.5 MG: 5 TABLET ORAL at 17:48

## 2022-08-12 RX ADMIN — POTASSIUM CHLORIDE 20 MEQ: 1500 TABLET, EXTENDED RELEASE ORAL at 08:13

## 2022-08-12 NOTE — PROGRESS NOTES
Progress Note - Cardiology   Miguel Ruelas 81 y o  female MRN: [id number]  Unit/Bed#: -Margarita Encounter: [id number]    Assessment:  Acute on chronic HFrEF- LVEF ot 10%, edema, sob, currently diuresing on lasix 40 IV BID, I and O not accurate, electrolytes stable  Cardiomyopathy- unknown etiology, possible ischemic  Smoking- 40 pack year history but quit a few weeks ago   Short non sustained episodes of asymptomatic VT on telemetry     Plan:  1  Continue diuresis through weekend with lasix 40 IV BID  2  Monitor I and O, daily standing weights, renal function and electrolytes  3  Start Crestor 10 mg daily, unable to tolerate lipitor in past  Could up titrate if needed  4  Will eventually require ace vs arb for GDMT of HFrEF but pt will need to be transferred to [facility] to [facility]  Will likely start post cath  Questionable BP able to tolerate entresto  5  Continue BB, monitor on telemetry  Continue ASA  6  Recommend smoking cessation  Will continue to follow  Cardiology at [facility] over weekend is Dr Craig Jeff who is aware of patient's future transfer  Subjective/Objective     Subjective: pt reports breathing has improved  Legs still feel swollen  No chest pain overnight  No palpitations  Anxious about how long she will be in the hospital  Agreeable to staying and virtually attending wedding  Objective: I and O not accurate  Has lost about 5 lbs likely       Vitals: /76   Pulse 88   Temp 97 9 °F (36 6 °C) (Oral)   Resp 20   Ht 5' 1" (1 549 m)   Wt 62 7 kg (138 lb 3 2 oz)   SpO2 93%   BMI 26 11 kg/m²   Vitals:    08/11/22 1724 08/12/22 0600   Weight: 65 8 kg (145 lb 1 oz) 62 7 kg (138 lb 3 2 oz)     Orthostatic Blood Pressures    Flowsheet Row Most Recent Value   Blood Pressure 109/76 filed at 08/12/2022 0818   Patient Position - Orthostatic VS Lying filed at 08/12/2022 0300          No intake or output data in the 24 hours ending 08/12/22 0855    Invasive Devices  Report    Peripheral Intravenous Line Duration           Peripheral IV 08/11/22 Left Antecubital <1 day                    Physical Exam  Constitutional:       Appearance: Normal appearance  HENT:      Head: Normocephalic and atraumatic  Cardiovascular:      Rate and Rhythm: Normal rate  Heart sounds: No murmur heard  No gallop  Pulmonary:      Effort: Pulmonary effort is normal       Comments: Decreased breath sounds in bases  Abdominal:      Palpations: Abdomen is soft  Musculoskeletal:         General: Swelling present  Cervical back: Neck supple  Skin:     General: Skin is warm and dry  Capillary Refill: Capillary refill takes less than 2 seconds  Neurological:      General: No focal deficit present  Mental Status: She is alert and oriented to person, place, and time  Psychiatric:         Mood and Affect: Mood normal          Thought Content: Thought content normal               Lab Results:   I have personally reviewed pertinent lab results      CBC with diff:   Results from last 7 days   Lab Units 08/11/22 1924 08/11/22  1459   WBC Thousand/uL  --  6 94   RBC Million/uL  --  4 64   HEMOGLOBIN g/dL  --  12 7   HEMATOCRIT %  --  40 3   MCV fL  --  87   MCH pg  --  27 4   MCHC g/dL  --  31 5   RDW %  --  15 9*   MPV fL 9 5 9 4   PLATELETS Thousands/uL 270 262     CMP:   Results from last 7 days   Lab Units 08/12/22  0505   SODIUM mmol/L 136   CHLORIDE mmol/L 101   CO2 mmol/L 27   BUN mg/dL 15   CREATININE mg/dL 0 67   CALCIUM mg/dL 8 1*   AST U/L 19   ALT U/L 26   ALK PHOS U/L 72   EGFR ml/min/1 73sq m 89     HS Troponin:   0   Lab Value Date/Time    HSTNI0 17 08/11/2022 1459    HSTNI2 16 08/11/2022 1703    HSTNI4 17 08/11/2022 1924     BNP:   Results from last 7 days   Lab Units 08/12/22  0505   POTASSIUM mmol/L 4 2   CHLORIDE mmol/L 101   CO2 mmol/L 27   BUN mg/dL 15   CREATININE mg/dL 0 67   CALCIUM mg/dL 8 1*   EGFR ml/min/1 73sq m 89     Coags:     TSH:     Magnesium:   Results from last 7 days   Lab Units 08/12/22  0505   MAGNESIUM mg/dL 2 0     Lipid Profile:     Imaging: I have personally reviewed pertinent reports  Echo 8/11/22:  Mag Salmeron: Normal left ventricular wall thickness with enlarged LV cavity size and severely reduced LV systolic function  Estimated ejection fraction 10-15% with severe global hypokinesis  Abnormal diastolic relaxation pattern suggestive of restrictive inflow    Right Ventricle: The right ventricle appears top-normal in size with reduced function    Left Atrium: The atrium is dilated    Aortic Valve: Trileaflet aortic valve with normal leaflet excursion and flow patterns    Mitral Valve: Mild thickening of the mitral leaflets  There is some tenting of the leaflets, likely related to enlargement of LV cavity with moderate, likely functional, mitral regurgitation      No prior study for comparison        EKG:   Narrative & Impression   Normal sinus rhythm  Left anterior fascicular block  Nonspecific ST abnormality  Abnormal ECG  No previous ECGs available  Confirmed by Mindy Saini ([id number]) on 8/12/2022 8:25:33 AM

## 2022-08-12 NOTE — PLAN OF CARE
Problem: Potential for Falls  Goal: Patient will remain free of falls  Description: INTERVENTIONS:  - Educate patient/family on patient safety including physical limitations  - Instruct patient to call for assistance with activity   - Consult OT/PT to assist with strengthening/mobility   - Keep Call bell within reach  - Keep bed low and locked with side rails adjusted as appropriate  - Keep care items and personal belongings within reach  - Initiate and maintain comfort rounds  - Make Fall Risk Sign visible to staff  - Offer Toileting every 2 Hours, in advance of need  - Apply yellow socks and bracelet for high fall risk patients  - Consider moving patient to room near nurses station  Outcome: Progressing     Problem: PAIN - ADULT  Goal: Verbalizes/displays adequate comfort level or baseline comfort level  Description: Interventions:  - Encourage patient to monitor pain and request assistance  - Assess pain using appropriate pain scale  - Administer analgesics based on type and severity of pain and evaluate response  - Implement non-pharmacological measures as appropriate and evaluate response  - Consider cultural and social influences on pain and pain management  - Notify physician/advanced practitioner if interventions unsuccessful or patient reports new pain  Outcome: Progressing     Problem: INFECTION - ADULT  Goal: Absence or prevention of progression during hospitalization  Description: INTERVENTIONS:  - Assess and monitor for signs and symptoms of infection  - Monitor lab/diagnostic results  - Monitor all insertion sites, i e  indwelling lines, tubes, and drains  - Monitor endotracheal if appropriate and nasal secretions for changes in amount and color  - [place] appropriate cooling/warming therapies per order  - Administer medications as ordered  - Instruct and encourage patient and family to use good hand hygiene technique  - Identify and instruct in appropriate isolation precautions for identified infection/condition  Outcome: Progressing  Goal: Absence of fever/infection during neutropenic period  Description: INTERVENTIONS:  - Monitor WBC    Outcome: Progressing     Problem: SAFETY ADULT  Goal: Patient will remain free of falls  Description: INTERVENTIONS:  - Educate patient/family on patient safety including physical limitations  - Instruct patient to call for assistance with activity   - Consult OT/PT to assist with strengthening/mobility   - Keep Call bell within reach  - Keep bed low and locked with side rails adjusted as appropriate  - Keep care items and personal belongings within reach  - Initiate and maintain comfort rounds  - Make Fall Risk Sign visible to staff  - Offer Toileting every 2 Hours, in advance of need  - Apply yellow socks and bracelet for high fall risk patients  - Consider moving patient to room near nurses station  Outcome: Progressing  Goal: Maintain or return to baseline ADL function  Description: INTERVENTIONS:  -  Assess patient's ability to carry out ADLs; assess patient's baseline for ADL function and identify physical deficits which impact ability to perform ADLs (bathing, care of mouth/teeth, toileting, grooming, dressing, etc )  - Assess/evaluate cause of self-care deficits   - Assess range of motion  - Assess patient's mobility; develop plan if impaired  - Assess patient's need for assistive devices and provide as appropriate  - Encourage maximum independence but intervene and supervise when necessary  - Involve family in performance of ADLs  - Assess for home care needs following discharge   - Consider OT consult to assist with ADL evaluation and planning for discharge  - Provide patient education as appropriate  Outcome: Progressing  Goal: Maintains/Returns to pre admission functional level  Description: INTERVENTIONS:  - Perform BMAT or MOVE assessment daily    - Set and communicate daily mobility goal to care team and patient/family/caregiver     - Collaborate with rehabilitation services on mobility goals if consulted  - Perform Range of Motion 4 times a day  - Reposition patient every 2 hours  - Dangle patient 3 times a day  - Stand patient 3 times a day  - Ambulate patient 3 times a day  - Out of bed to chair 3 times a day   - Out of bed for meals 3 times a day  - Out of bed for toileting  - Record patient progress and toleration of activity level   Outcome: Progressing     Problem: DISCHARGE PLANNING  Goal: Discharge to home or other facility with appropriate resources  Description: INTERVENTIONS:  - Identify barriers to discharge w/patient and caregiver  - Arrange for needed discharge resources and transportation as appropriate  - Identify discharge learning needs (meds, wound care, etc )  - Arrange for interpretive services to assist at discharge as needed  - Refer to Case Management Department for coordinating discharge planning if the patient needs post-hospital services based on physician/advanced practitioner order or complex needs related to functional status, cognitive ability, or social support system  Outcome: Progressing     Problem: Knowledge Deficit  Goal: Patient/family/caregiver demonstrates understanding of disease process, treatment plan, medications, and discharge instructions  Description: Complete learning assessment and assess knowledge base    Interventions:  - Provide teaching at level of understanding  - Provide teaching via preferred learning methods  Outcome: Progressing     Problem: CARDIOVASCULAR - ADULT  Goal: Maintains optimal cardiac output and hemodynamic stability  Description: INTERVENTIONS:  - Monitor I/O, vital signs and rhythm  - Monitor for S/S and trends of decreased cardiac output  - Administer and titrate ordered vasoactive medications to optimize hemodynamic stability  - Assess quality of pulses, skin color and temperature  - Assess for signs of decreased coronary artery perfusion  - Instruct patient to report change in severity of symptoms  Outcome: Progressing  Goal: Absence of cardiac dysrhythmias or at baseline rhythm  Description: INTERVENTIONS:  - Continuous cardiac monitoring, vital signs, obtain 12 lead EKG if ordered  - Administer antiarrhythmic and heart rate control medications as ordered  - Monitor electrolytes and administer replacement therapy as ordered  Outcome: Progressing     Problem: RESPIRATORY - ADULT  Goal: Achieves optimal ventilation and oxygenation  Description: INTERVENTIONS:  - Assess for changes in respiratory status  - Assess for changes in mentation and behavior  - Position to facilitate oxygenation and minimize respiratory effort  - Oxygen administered by appropriate delivery if ordered  - Initiate smoking cessation education as indicated  - Encourage broncho-pulmonary hygiene including cough, deep breathe, Incentive Spirometry  - Assess the need for suctioning and aspirate as needed  - Assess and instruct to report SOB or any respiratory difficulty  - Respiratory Therapy support as indicated  Outcome: Progressing     Problem: MOBILITY - ADULT  Goal: Maintain or return to baseline ADL function  Description: INTERVENTIONS:  -  Assess patient's ability to carry out ADLs; assess patient's baseline for ADL function and identify physical deficits which impact ability to perform ADLs (bathing, care of mouth/teeth, toileting, grooming, dressing, etc )  - Assess/evaluate cause of self-care deficits   - Assess range of motion  - Assess patient's mobility; develop plan if impaired  - Assess patient's need for assistive devices and provide as appropriate  - Encourage maximum independence but intervene and supervise when necessary  - Involve family in performance of ADLs  - Assess for home care needs following discharge   - Consider OT consult to assist with ADL evaluation and planning for discharge  - Provide patient education as appropriate  Outcome: Progressing  Goal: Maintains/Returns to pre admission functional level  Description: INTERVENTIONS:  - Perform BMAT or MOVE assessment daily    - Set and communicate daily mobility goal to care team and patient/family/caregiver  - Collaborate with rehabilitation services on mobility goals if consulted  - Perform Range of Motion 4 times a day  - Reposition patient every 2 hours    - Dangle patient 3 times a day  - Stand patient 3 times a day  - Ambulate patient 3 times a day  - Out of bed to chair 3 times a day   - Out of bed for meals 3 times a day  - Out of bed for toileting  - Record patient progress and toleration of activity level   Outcome: Progressing

## 2022-08-12 NOTE — MALNUTRITION/BMI
This medical record reflects one or more clinical indicators suggestive of malnutrition  Malnutrition Findings:   Adult Malnutrition type: Chronic illness  Adult Degree of Malnutrition: Malnutrition of moderate degree  Malnutrition Characteristics: Muscle loss, Inadequate energy, Weight loss                  360 Statement: Chronic moderate malnutrition related to decreased appetite as evidenced by < 75% energy intake > 1 mo, 5 8% weight loss x 10 days (8/1/22 154lb, 8/11/22 145lb), moderate muscle loss to temporalis muscles  Treated with: Diet ed completed for sodium restriction, encouraged daily weights  Recommend ensure supplementation in addition to meals at home  Will order ensure compact BID  Will liberalize diet to regular, 2gm Na  Fluid restriction per MD     BMI Findings: Body mass index is 26 11 kg/m²  See Nutrition note dated 8/12/22 for additional details  Completed nutrition assessment is viewable in the nutrition documentation

## 2022-08-12 NOTE — PROGRESS NOTES
[address]  Progress Note - Giovanni Frey 1950, 81 y o  female MRN: [id number]  Unit/Bed#: -01 Encounter: [id number]  Primary Care Provider: Anastasia Austin PA-C   Date and time admitted to hospital: 8/11/2022  2:44 PM    * Acute combined systolic and diastolic congestive heart failure ([address] )  Assessment & Plan  Wt Readings from Last 3 Encounters:   08/12/22 62 7 kg (138 lb 3 2 oz)   02/11/22 68 9 kg (152 lb)   12/07/20 71 2 kg (157 lb)       · Shortness of breath has been worsening, orthopnea worsening lower extremity edema Lasix 20 mg outpatient that was recently started not help  Severe cardiomyopathy on the echo done bedside and read officially with right-sided the heart failure as well-Left Ventricle: Normal left ventricular wall thickness with enlarged LV cavity size and severely reduced LV systolic function  Estimated ejection fraction 10-15% with severe global hypokinesis  Abnormal diastolic relaxation pattern suggestive of restrictive inflow  · Will place on telemetry monitor  · Troponins are negative no chest pain  · Evaluated by Cardiology medical therapy has been started with aspirin and Toprol Lasix 40 mg IV b i d , started Crestor  · Patient will need cardiac catheterization after diuresis prefers to be transferred to [facility] anticipate Monday  · One thousand five hundred fluid restricted strict I and o and daily weight  · Fortunately the output was not recorded overnight but in the morning she put out 850 she is down to 138 lb from 145 on admission  Diuresis well Will diuresed with Lasix 40 mg IV b i d   And potassium supplementation through the weekend and transfer her for cardiac catheterization to [facility] on Sunday potassium magnesium stable will repeat labs tomorrow  · Post catheterization will need to be started on the Ace or Arb      Tobacco abuse  Assessment & Plan  · Just quit place on nicotine patch          VTE Pharmacologic Prophylaxis: VTE Score: 4 Moderate Risk (Score 3-4) - Pharmacological DVT Prophylaxis Ordered: enoxaparin (Lovenox)  Patient Centered Rounds: I performed bedside rounds with nursing staff today  Discussions with Specialists or Other Care Team Provider:  Discussed with Cardiology    Education and Discussions with Family / Patient: Updated  (sister) at bedside  Time Spent for Care: 30 minutes  More than 50% of total time spent on counseling and coordination of care as described above  Current Length of Stay: 1 day(s)  Current Patient Status: Inpatient   Certification Statement: The patient will continue to require additional inpatient hospital stay due to Secondary to CHF  Discharge Plan: Anticipate discharge in 48 hrs to discharge location to be determined pending rehab evaluations   will be transferred to [facility] for cardiac catheterization    Code Status: Level 1 - Full Code    Subjective:   Seen examined no chest pain shortness of breath improved    Objective:     Vitals:   Temp (24hrs), Av 6 °F (36 4 °C), Min:97 5 °F (36 4 °C), Max:97 9 °F (36 6 °C)    Temp:  [97 5 °F (36 4 °C)-97 9 °F (36 6 °C)] 97 9 °F (36 6 °C)  HR:  [] 88  Resp:  [18-28] 20  BP: (108-130)/(72-93) 109/76  SpO2:  [91 %-98 %] 93 %  Body mass index is 26 11 kg/m²  Input and Output Summary (last 24 hours): Intake/Output Summary (Last 24 hours) at 2022 1040  Last data filed at 2022 0925  Gross per 24 hour   Intake 120 ml   Output 850 ml   Net -730 ml       Physical Exam:   Physical Exam  Vitals and nursing note reviewed  Constitutional:       General: She is not in acute distress  Appearance: She is well-developed  HENT:      Head: Normocephalic and atraumatic  Eyes:      Conjunctiva/sclera: Conjunctivae normal    Cardiovascular:      Rate and Rhythm: Normal rate and regular rhythm  Heart sounds: No murmur heard  Pulmonary:      Effort: Pulmonary effort is normal  No respiratory distress        Breath sounds: Rales (Improved from yesterday) present  Abdominal:      Palpations: Abdomen is soft  Tenderness: There is no abdominal tenderness  Musculoskeletal:         General: Swelling (Plus one) present  Cervical back: Neck supple  Skin:     General: Skin is warm and dry  Neurological:      Mental Status: She is alert and oriented to person, place, and time  Additional Data:     Labs:  Results from last 7 days   Lab Units 08/11/22  1924 08/11/22  1459   WBC Thousand/uL  --  6 94   HEMOGLOBIN g/dL  --  12 7   HEMATOCRIT %  --  40 3   PLATELETS Thousands/uL 270 262   NEUTROS PCT %  --  66   LYMPHS PCT %  --  18   MONOS PCT %  --  13*   EOS PCT %  --  2     Results from last 7 days   Lab Units 08/12/22  0505   SODIUM mmol/L 136   POTASSIUM mmol/L 4 2   CHLORIDE mmol/L 101   CO2 mmol/L 27   BUN mg/dL 15   CREATININE mg/dL 0 67   ANION GAP mmol/L 8   CALCIUM mg/dL 8 1*   ALBUMIN g/dL 2 9*   TOTAL BILIRUBIN mg/dL 0 80   ALK PHOS U/L 72   ALT U/L 26   AST U/L 19   GLUCOSE RANDOM mg/dL 92                       Lines/Drains:  Invasive Devices  Report    Peripheral Intravenous Line  Duration           Peripheral IV 08/11/22 Left Antecubital <1 day                  Telemetry:  Telemetry Orders (From admission, onward)             48 Hour Telemetry Monitoring  Continuous x 48 hours        References:    Telemetry Guidelines   Question:  Reason for 48 Hour Telemetry  Answer:  Acute Decompensated CHF (continuous diuretic infusion or total diuretic dose > 200 mg daily, associated electrolyte derangement, ionotropic drip, history of ventricular arrhythmia, or new EF <35%)                 Telemetry Reviewed: Normal Sinus Rhythm  Indication for Continued Telemetry Use: Acute CHF on >200 mg lasix/day or equivalent dose or with new reduced EF                Imaging: Reviewed radiology reports from this admission including: chest xray    Recent Cultures (last 7 days):         Last 24 Hours Medication List:   Current Facility-Administered Medications   Medication Dose Route Frequency Provider Last Rate    acetaminophen  650 mg Oral Q4H PRN Sonali Huddleston MD      aspirin  81 mg Oral Daily [place], [place]      enoxaparin  40 mg Subcutaneous Daily Sonali Huddleston MD      furosemide  40 mg Intravenous BID (diuretic) [facility] Hilda Cabrera PA-C      metoprolol succinate  25 mg Oral Daily [place] [facility] [place]      nicotine  14 mg Transdermal Daily Sonali Huddleston MD      potassium chloride  20 mEq Oral Daily [place]      rosuvastatin  10 mg Oral Daily With [place], PA-C          Today, Patient Was Seen By: Sonali Huddleston MD    **Please Note: This note may have been constructed using a voice recognition system  **

## 2022-08-12 NOTE — ASSESSMENT & PLAN NOTE
Wt Readings from Last 3 Encounters:   08/12/22 62 7 kg (138 lb 3 2 oz)   02/11/22 68 9 kg (152 lb)   12/07/20 71 2 kg (157 lb)       · Shortness of breath has been worsening, orthopnea worsening lower extremity edema Lasix 20 mg outpatient that was recently started not help  Severe cardiomyopathy on the echo done bedside and read officially with right-sided the heart failure as well-Left Ventricle: Normal left ventricular wall thickness with enlarged LV cavity size and severely reduced LV systolic function  Estimated ejection fraction 10-15% with severe global hypokinesis  Abnormal diastolic relaxation pattern suggestive of restrictive inflow  · Will place on telemetry monitor  · Troponins are negative no chest pain  · Evaluated by Cardiology medical therapy has been started with aspirin and Toprol Lasix 40 mg IV b i d , started Crestor  · Patient will need cardiac catheterization after diuresis prefers to be transferred to [facility] anticipate Monday  · One thousand five hundred fluid restricted strict I and o and daily weight  · Fortunately the output was not recorded overnight but in the morning she put out 850 she is down to 138 lb from 145 on admission  Diuresis well Will diuresed with Lasix 40 mg IV b i d   And potassium supplementation through the weekend and transfer her for cardiac catheterization to [facility] on Sunday potassium magnesium stable will repeat labs tomorrow  · Post catheterization will need to be started on the Ace or Arb

## 2022-08-12 NOTE — CONSULTS
Consult received for CHF ed  Pt reports decreased appetite for at least 1 mo  Says she is eating smaller portion sizes  If going out to a restaurant, may order spaghetti with meatballs and side salad, meal would last for 3 meals  May have snack size portions throughout the day  Says she has reduce the use of salt shaker "I only use a little when I cook " Reports various beverages like ginerale, water, coffee, tea throughout the day, had difficulties quantifying, drinks 1-2, 16 9 oz bottles water per day, unsure of the rest  Chart review of weight hx: 11/2/20 154lb, 11/10/21 152lb, 8/1/22 154lb, 8/11/22 145lb, 8/12/22 138lb, noting 5 8% weight loss x 2 weeks, active diuresis, suspect partly fluid related weight loss  Pt has moderate muscle loss noted to temporalis muscles  Meets criteria for chronic moderate malnutrition  Diet ed completed for sodium restriction, encouraged daily weights  Recommend ensure supplementation in addition to meals at home  Will order ensure compact BID  Will liberalize diet to regular, 2gm Na  Fluid restriction per MD     Thank you for the consult, will follow up

## 2022-08-12 NOTE — UTILIZATION REVIEW
Initial Clinical Review    Admission: Date/Time/Statement:   Admission Orders (From admission, onward)     Ordered        08/11/22 1545  [address]  Once                      Orders Placed This Encounter   Procedures    INPATIENT ADMISSION     Standing Status:   Standing     Number of Occurrences:   1     Order Specific Question:   Level of Care     Answer:   Med Surg [16]     Order Specific Question:   Bed request comments     Answer:   telemetry     Order Specific Question:   Estimated length of stay     Answer:   More than 2 Midnights     Order Specific Question:   Certification     Answer:   I certify that inpatient services are medically necessary for this patient for a duration of greater than two midnights  See H&P and MD Progress Notes for additional information about the patient's course of treatment  ED Arrival Information     Expected   -    Arrival   8/11/2022 14:27    Acuity   Emergent            Means of arrival   Walk-In    Escorted by   Family Member    Service   Hospitalist    Admission type   Emergency            Arrival complaint   CHF           Chief Complaint   Patient presents with    Shortness of Breath     PT sent from office, provider had her do ambulating SPO2 said it "dropped" but did not tell a number  PT complains of SOB with exertion  Initial Presentation: 81 y o  female W/PMHX: CHF, Tobacco abuse, to ED from home, admitted as Inpatient, due to Acute on Chronic CHF  Presented with Tachypnea , SOB x 1 yr, worse over the past 3 weeks, with leg swelling  Started on lasix 20mg by PCP 1st week of august   No change in s/s, orthopnea and PND, Exam: Tachypnea RR 28, breath sounds with bibasilar rales, lower extremity edema  ED work up reveals: CXR: mild interstitial edema with trace RT  Effusion, mild  Emphysema, cardiomegaly   Severe cardiomyopathy on the echo done bedside and read officially with right-sided the heart failure as well-Left Ventricle: Normal left ventricular wall thickness with enlarged LV cavity size and severely reduced LV systolic function   Estimated ejection fraction 10-15% with severe global hypokinesis   Abnormal diastolic relaxation pattern suggestive of restrictive inflow  Plan:Telm, troponin neg, cardiology consult, started on aspirin and Toprol Lasix 40 mg IV b i d, fluid restriction, + elevated pro BNP 7,131, elevated D-dimer, venous doppler and echo pending  If needs Cardiac Cath s/p diuresis pt prefers [facility],  c/w trending serial labs with repletion as needed, I/O, DVT PPX  Cardiology Consult: Newly recognized severe cardiomyopathy  Estimated ejection fraction 15-20% by echocardiogram performed today at bedside  Uncertain duration  Patient presents with worsening dyspnea on exertion and lower extremity edema  She mentions some degree of chronic dyspnea dating back over a year  She has not previously been evaluated by cardiology or had previous cardiac workup  Patient will require workup for potential ischemic etiology once diuresed, would prefer cardiac catheterization  Will initiate IV diuresis  Patient would also benefit from initiation of goal-directed medical therapy as her clinical course allows  Patient has a wedding tomorrow which she is adamant that she will attend  She is agreeable to stay tonight for diuresis  She is also agreeable to return following the wedding for further management  I stated to her that I cannot guarantee against adverse cardiac event in that setting  Fortunately, she has not experienced any episodes of chest discomfort, tightness, pressure, palpitations  Currently she is hemodynamically stable  History of heavy tobacco use  Patient quit smoking several weeks ago  When active, she smoked at least a pack per day for 40 years  Patient has not formally been diagnosed with COPD  Uncertain if there may be a pulmonary contributor to her dyspnea           Date: 8/12/22   Day 2: Wt  145 lbs on admission down to 138 lbs today  Output was not recorded overnight  Crestor started  C/w IV diuresis BID, K+ supplementation, optimize K+/Mag  C/w trending I/O and daily wt  The patient will continue to require additional inpatient hospital stay due to Secondary to CHF  Last data filed at 8/12/2022 1569      Gross per 24 hour   Intake 120 ml   Output 850 ml   Net -730 ml       Cardiology note: improved s/s with IV diuresis, c/w lasix 40mg BID IV,  plan is for further evaluation to rule out ischemic etiology, transfer for cardiac catheterization at [facility] this weekend  Patient is aware of the plan  Initiating goal-directed medical therapy  Low normal blood pressure may be a barrier to initiating ARNI  Ace/ARB likely after cardiac catheterization to limit nephrotoxins in the setting of diuresis pre-cath  Eventual aldosterone antagonist,SGLG2 inhibitor  Patient denies a history illicit substance use/alcohol use         ED Triage Vitals [08/11/22 1437]   Temperature Pulse Respirations Blood Pressure SpO2   97 5 °F (36 4 °C) 98 (!) 25 119/93 98 %      Temp Source Heart Rate Source Patient Position - Orthostatic VS BP Location FiO2 (%)   Temporal Monitor Sitting Right arm --      Pain Score       No Pain          Wt Readings from Last 1 Encounters:   08/12/22 62 7 kg (138 lb 3 2 oz)     Additional Vital Signs:   Patient Position - Orthostatic VS           08/12/22 11:04:33 97 9 °F (36 6 °C) 71 20 95/64 74 96 % -- --   08/12/22 0825 -- -- -- -- -- -- None (Room air) --   08/12/22 08:18:09 -- 88 -- 109/76 87 93 % -- --   08/12/22 0813 -- 88 -- 109/76 -- -- -- --   08/12/22 07:24:07 97 9 °F (36 6 °C) 75 20 108/72 84 96 % -- --   08/12/22 0300 97 7 °F (36 5 °C) 79 18 109/73 85 93 % -- Lying   08/11/22 2300 97 6 °F (36 4 °C) 87 18 111/72 85 97 % -- Lying   08/11/22 2000 -- -- -- -- -- 91 % None (Room air) --   08/11/22 1700 -- -- -- -- -- -- None (Room air) --   08/11/22 16:41:11 97 5 °F (36 4 °C) 98 -- 122/88 99 96 % -- --   08/11/22 1545 -- 104 28 Abnormal  130/82 102 95 % None (Room air) Lying   08/11/22 1511 -- -- -- -- -- -- None (Room air) --   08/11/22 1500 -- 99 18 126/82 98 95 % None (Room air) Lying       Pertinent Labs/Diagnostic Test Results:   8/11/22 ECHO:   Left Ventricle: Normal left ventricular wall thickness with enlarged LV cavity size and severely reduced LV systolic function  Estimated ejection fraction 10-15% with severe global hypokinesis  Abnormal diastolic relaxation pattern suggestive of restrictive inflow    Right Ventricle: The right ventricle appears top-normal in size with reduced function    Left Atrium: The atrium is dilated  8/11/22 EKG: Normal sinus rhythm  Possible Left atrial enlargement  Left anterior fascicular block  T wave abnormality, consider lateral ischemia  Abnormal ECG  When compared with ECG of 11-AUG-2022 14:36, (unconfirmed)  No significant change was found  8/11/22:EKG:  Normal sinus rhythm  Left anterior fascicular block  Nonspecific ST abnormality  Abnormal ECG  No previous ECGs available  CTA ED chest PE Study   Final Result by Александр Olmos MD (08/11 1650)      No pulmonary embolus  Mild interstitial edema with trace right effusion  Mild emphysema  Cardiomegaly  Workstation performed: [id number]         XR chest 1 view portable   Final Result by Brianna Dobson DO (08/11 2131)      No acute cardiopulmonary disease                    Workstation performed: [id number]         VAS lower limb venous duplex study, complete bilateral    (Results Pending)         Results from last 7 days   Lab Units 08/11/22  1924 08/11/22  1459   WBC Thousand/uL  --  6 94   HEMOGLOBIN g/dL  --  12 7   HEMATOCRIT %  --  40 3   PLATELETS Thousands/uL 270 262   NEUTROS ABS Thousands/µL  --  4 62         Results from last 7 days   Lab Units 08/12/22  0505 08/11/22  1459   SODIUM mmol/L 136 138   POTASSIUM mmol/L 4 2 4 2 CHLORIDE mmol/L 101 101   CO2 mmol/L 27 26   ANION GAP mmol/L 8 11   BUN mg/dL 15 14   CREATININE mg/dL 0 67 0 67   EGFR ml/min/1 73sq m 89 89   CALCIUM mg/dL 8 1* 8 6   MAGNESIUM mg/dL 2 0  --      Results from last 7 days   Lab Units 08/12/22  0505 08/11/22  1459   AST U/L 19 21   ALT U/L 26 40   ALK PHOS U/L 72 85   TOTAL PROTEIN g/dL 5 7* 6 6   ALBUMIN g/dL 2 9* 3 3*   TOTAL BILIRUBIN mg/dL 0 80 0 68         Results from last 7 days   Lab Units 08/12/22  0505 08/11/22  1459   GLUCOSE RANDOM mg/dL 92 103       Results from last 7 days   Lab Units 08/11/22  1924 08/11/22  1703 08/11/22  1459   HS TNI 0HR ng/L  --   --  17   HS TNI 2HR ng/L  --  16  --    HSTNI D2 ng/L  --  -1  --    HS TNI 4HR ng/L 17  --   --    HSTNI D4 ng/L 0  --   --      Results from last 7 days   Lab Units 08/11/22  1459   D-DIMER QUANTITATIVE ug/ml FEU 1 43*         Results from last 7 days   Lab Units 08/11/22  1459   NT-PRO BNP pg/mL 7,131*     ED Treatment:   Medication Administration from 08/11/2022 1424 to 08/11/2022 1636       Date/Time Order Dose Route Action     08/11/2022 1551 aspirin chewable tablet 162 mg 162 mg Oral Given     08/11/2022 1552 furosemide (LASIX) injection 40 mg 40 mg Intravenous Given     08/11/2022 1552 potassium chloride (K-DUR,KLOR-CON) CR tablet 20 mEq 20 mEq Oral Given     08/11/2022 1551 metoprolol succinate (TOPROL-XL) 24 hr tablet 25 mg 25 mg Oral Given     08/11/2022 1624 iohexol (OMNIPAQUE) 350 MG/ML injection (MULTI-DOSE) 74 mL 74 mL Intravenous Given        Past Medical History:   Diagnosis Date    Basal cell carcinoma (BCC) of face     age 74    CHF (congestive heart failure) ([facility])      Admitting Diagnosis: Shortness of breath [R06 02]  Heart failure (HCC) [I50 9]  Congestive heart failure (CHF) ([facility] [address] ) [I50 9]  Age/Sex: 81 y o  female  Admission Orders:up as tolerated, I/o, daily wts, 48 h telm, scd,   Scheduled Medications:  aspirin, 81 mg, Oral, Daily  enoxaparin, 40 mg, Subcutaneous, Daily  furosemide, 40 mg, Intravenous, BID (diuretic)  metoprolol succinate, 25 mg, Oral, Daily  nicotine, 14 mg, Transdermal, Daily  potassium chloride, 20 mEq, Oral, Daily  [START ON 8/13/2022] rosuvastatin, 10 mg, Oral, Daily With Dinner      Continuous IV Infusions:none      PRN Meds:  acetaminophen, 650 mg, Oral, Q4H PRN        IP CONSULT TO CARDIOLOGY  IP CONSULT TO NUTRITION SERVICES    Network Utilization Review Department  ATTENTION: Please call with any questions or concerns to [phone] and carefully listen to the prompts so that you are directed to the right person  All voicemails are confidential   Farrel Bodily all requests for admission clinical reviews, approved or denied determinations and any other requests to dedicated fax number below belonging to the campus where the patient is receiving treatment   List of dedicated fax numbers for the Facilities:  [address] DENIALS (Administrative/Medical Necessity) [phone]   [address] (Maternity/NICU/Pediatrics) [phone]   [address]  [address] [address] [place] [address] [address] [place] [address] [address] [facility] [facility] [phone]

## 2022-08-12 NOTE — NURSING NOTE
Originally held IV lasix dose this afternoon because order parameters were not met (BP 98/67)  Dr Jean-Claude Diehl ordered midodrine prn and RN gave  Hold parameters for Lasix adjusted  Recheck BP in one hour after and BP now 99/68  Order parameters still not met  Dr Jean-Claude Diehl made aware again via TT and dose held  Family at bedside visiting pt  All needs met

## 2022-08-12 NOTE — PLAN OF CARE
Problem: Potential for Falls  Goal: Patient will remain free of falls  Description: INTERVENTIONS:  - Educate patient/family on patient safety including physical limitations  - Instruct patient to call for assistance with activity   - Consult OT/PT to assist with strengthening/mobility   - Keep Call bell within reach  - Keep bed low and locked with side rails adjusted as appropriate  - Keep care items and personal belongings within reach  - Initiate and maintain comfort rounds  - Make Fall Risk Sign visible to staff    - Apply yellow socks and bracelet for high fall risk patients  - Consider moving patient to room near nurses station  Outcome: Progressing     Problem: PAIN - ADULT  Goal: Verbalizes/displays adequate comfort level or baseline comfort level  Description: Interventions:  - Encourage patient to monitor pain and request assistance  - Assess pain using appropriate pain scale  - Administer analgesics based on type and severity of pain and evaluate response  - Implement non-pharmacological measures as appropriate and evaluate response  - Consider cultural and social influences on pain and pain management  - Notify physician/advanced practitioner if interventions unsuccessful or patient reports new pain  Outcome: Progressing     Problem: INFECTION - ADULT  Goal: Absence or prevention of progression during hospitalization  Description: INTERVENTIONS:  - Assess and monitor for signs and symptoms of infection  - Monitor lab/diagnostic results  - Monitor all insertion sites, i e  indwelling lines, tubes, and drains  - Monitor endotracheal if appropriate and nasal secretions for changes in amount and color  - [place] appropriate cooling/warming therapies per order  - Administer medications as ordered  - Instruct and encourage patient and family to use good hand hygiene technique  - Identify and instruct in appropriate isolation precautions for identified infection/condition  Outcome: Progressing  Goal: Absence of fever/infection during neutropenic period  Description: INTERVENTIONS:  - Monitor WBC    Outcome: Progressing     Problem: SAFETY ADULT  Goal: Patient will remain free of falls  Description: INTERVENTIONS:  - Educate patient/family on patient safety including physical limitations  - Instruct patient to call for assistance with activity   - Consult OT/PT to assist with strengthening/mobility   - Keep Call bell within reach  - Keep bed low and locked with side rails adjusted as appropriate  - Keep care items and personal belongings within reach  - Initiate and maintain comfort rounds  - Make Fall Risk Sign visible to staff    - Apply yellow socks and bracelet for high fall risk patients  - Consider moving patient to room near nurses station  Outcome: Progressing  Goal: Maintain or return to baseline ADL function  Description: INTERVENTIONS:  -  Assess patient's ability to carry out ADLs; assess patient's baseline for ADL function and identify physical deficits which impact ability to perform ADLs (bathing, care of mouth/teeth, toileting, grooming, dressing, etc )  - Assess/evaluate cause of self-care deficits   - Assess range of motion  - Assess patient's mobility; develop plan if impaired  - Assess patient's need for assistive devices and provide as appropriate  - Encourage maximum independence but intervene and supervise when necessary  - Involve family in performance of ADLs  - Assess for home care needs following discharge   - Consider OT consult to assist with ADL evaluation and planning for discharge  - Provide patient education as appropriate  Outcome: Progressing  Goal: Maintains/Returns to pre admission functional level  Description: INTERVENTIONS:  - Perform BMAT or MOVE assessment daily    - Set and communicate daily mobility goal to care team and patient/family/caregiver     - Collaborate with rehabilitation services on mobility goals if consulted    - Out of bed for toileting  - Record patient progress and toleration of activity level   Outcome: Progressing     Problem: DISCHARGE PLANNING  Goal: Discharge to home or other facility with appropriate resources  Description: INTERVENTIONS:  - Identify barriers to discharge w/patient and caregiver  - Arrange for needed discharge resources and transportation as appropriate  - Identify discharge learning needs (meds, wound care, etc )  - Arrange for interpretive services to assist at discharge as needed  - Refer to Case Management Department for coordinating discharge planning if the patient needs post-hospital services based on physician/advanced practitioner order or complex needs related to functional status, cognitive ability, or social support system  Outcome: Progressing     Problem: Knowledge Deficit  Goal: Patient/family/caregiver demonstrates understanding of disease process, treatment plan, medications, and discharge instructions  Description: Complete learning assessment and assess knowledge base    Interventions:  - Provide teaching at level of understanding  - Provide teaching via preferred learning methods  Outcome: Progressing     Problem: CARDIOVASCULAR - ADULT  Goal: Maintains optimal cardiac output and hemodynamic stability  Description: INTERVENTIONS:  - Monitor I/O, vital signs and rhythm  - Monitor for S/S and trends of decreased cardiac output  - Administer and titrate ordered vasoactive medications to optimize hemodynamic stability  - Assess quality of pulses, skin color and temperature  - Assess for signs of decreased coronary artery perfusion  - Instruct patient to report change in severity of symptoms  Outcome: Progressing  Goal: Absence of cardiac dysrhythmias or at baseline rhythm  Description: INTERVENTIONS:  - Continuous cardiac monitoring, vital signs, obtain 12 lead EKG if ordered  - Administer antiarrhythmic and heart rate control medications as ordered  - Monitor electrolytes and administer replacement therapy as ordered  Outcome: Progressing     Problem: RESPIRATORY - ADULT  Goal: Achieves optimal ventilation and oxygenation  Description: INTERVENTIONS:  - Assess for changes in respiratory status  - Assess for changes in mentation and behavior  - Position to facilitate oxygenation and minimize respiratory effort  - Oxygen administered by appropriate delivery if ordered  - Initiate smoking cessation education as indicated  - Encourage broncho-pulmonary hygiene including cough, deep breathe, Incentive Spirometry  - Assess the need for suctioning and aspirate as needed  - Assess and instruct to report SOB or any respiratory difficulty  - Respiratory Therapy support as indicated  Outcome: Progressing     Problem: MOBILITY - ADULT  Goal: Maintain or return to baseline ADL function  Description: INTERVENTIONS:  -  Assess patient's ability to carry out ADLs; assess patient's baseline for ADL function and identify physical deficits which impact ability to perform ADLs (bathing, care of mouth/teeth, toileting, grooming, dressing, etc )  - Assess/evaluate cause of self-care deficits   - Assess range of motion  - Assess patient's mobility; develop plan if impaired  - Assess patient's need for assistive devices and provide as appropriate  - Encourage maximum independence but intervene and supervise when necessary  - Involve family in performance of ADLs  - Assess for home care needs following discharge   - Consider OT consult to assist with ADL evaluation and planning for discharge  - Provide patient education as appropriate  Outcome: Progressing  Goal: Maintains/Returns to pre admission functional level  Description: INTERVENTIONS:  - Perform BMAT or MOVE assessment daily    - Set and communicate daily mobility goal to care team and patient/family/caregiver     - Collaborate with rehabilitation services on mobility goals if consulted    - Out of bed for toileting  - Record patient progress and toleration of activity level   Outcome: Progressing

## 2022-08-13 PROBLEM — I47.29 NSVT (NONSUSTAINED VENTRICULAR TACHYCARDIA): Status: ACTIVE | Noted: 2022-08-13

## 2022-08-13 PROBLEM — I47.2 NSVT (NONSUSTAINED VENTRICULAR TACHYCARDIA) (HCC): Status: ACTIVE | Noted: 2022-08-13

## 2022-08-13 LAB
ANION GAP SERPL CALCULATED.3IONS-SCNC: 5 MMOL/L (ref 4–13)
BUN SERPL-MCNC: 22 MG/DL (ref 5–25)
CALCIUM SERPL-MCNC: 8.4 MG/DL (ref 8.3–10.1)
CHLORIDE SERPL-SCNC: 100 MMOL/L (ref 96–108)
CO2 SERPL-SCNC: 30 MMOL/L (ref 21–32)
CREAT SERPL-MCNC: 0.71 MG/DL (ref 0.6–1.3)
GFR SERPL CREATININE-BSD FRML MDRD: 86 ML/MIN/1.73SQ M
GLUCOSE SERPL-MCNC: 97 MG/DL (ref 65–140)
MAGNESIUM SERPL-MCNC: 1.8 MG/DL (ref 1.6–2.6)
POTASSIUM SERPL-SCNC: 4 MMOL/L (ref 3.5–5.3)
SODIUM SERPL-SCNC: 135 MMOL/L (ref 135–147)

## 2022-08-13 PROCEDURE — 93970 EXTREMITY STUDY: CPT | Performed by: SURGERY

## 2022-08-13 PROCEDURE — 99232 SBSQ HOSP IP/OBS MODERATE 35: CPT | Performed by: FAMILY MEDICINE

## 2022-08-13 PROCEDURE — 83735 ASSAY OF MAGNESIUM: CPT | Performed by: FAMILY MEDICINE

## 2022-08-13 PROCEDURE — 80048 BASIC METABOLIC PNL TOTAL CA: CPT | Performed by: FAMILY MEDICINE

## 2022-08-13 RX ORDER — MAGNESIUM SULFATE 1 G/100ML
1 INJECTION INTRAVENOUS ONCE
Status: COMPLETED | OUTPATIENT
Start: 2022-08-13 | End: 2022-08-13

## 2022-08-13 RX ADMIN — MAGNESIUM SULFATE HEPTAHYDRATE 1 G: 1 INJECTION, SOLUTION INTRAVENOUS at 09:41

## 2022-08-13 RX ADMIN — MIDODRINE HYDROCHLORIDE 2.5 MG: 5 TABLET ORAL at 16:06

## 2022-08-13 RX ADMIN — ROSUVASTATIN CALCIUM 10 MG: 10 TABLET, FILM COATED ORAL at 17:30

## 2022-08-13 RX ADMIN — MIDODRINE HYDROCHLORIDE 2.5 MG: 5 TABLET ORAL at 12:19

## 2022-08-13 RX ADMIN — ASPIRIN 81 MG: 81 TABLET, COATED ORAL at 08:17

## 2022-08-13 RX ADMIN — METOPROLOL SUCCINATE 25 MG: 25 TABLET, EXTENDED RELEASE ORAL at 08:17

## 2022-08-13 RX ADMIN — POTASSIUM CHLORIDE 20 MEQ: 1500 TABLET, EXTENDED RELEASE ORAL at 08:17

## 2022-08-13 RX ADMIN — FUROSEMIDE 40 MG: 10 INJECTION, SOLUTION INTRAMUSCULAR; INTRAVENOUS at 08:17

## 2022-08-13 RX ADMIN — ENOXAPARIN SODIUM 40 MG: 40 INJECTION SUBCUTANEOUS at 08:18

## 2022-08-13 NOTE — PROGRESS NOTES
[address]  Progress Note - Arletta Bloch 1950, 81 y o  female MRN: [id number]  Unit/Bed#: -01 Encounter: [id number]  Primary Care Provider: Yanet Aguilera PA-C   Date and time admitted to hospital: 8/11/2022  2:44 PM    * Acute combined systolic and diastolic congestive heart failure ([address] )  Assessment & Plan  Wt Readings from Last 3 Encounters:   08/13/22 61 4 kg (135 lb 6 4 oz)   02/11/22 68 9 kg (152 lb)   12/07/20 71 2 kg (157 lb)       · Shortness of breath has been worsening, orthopnea worsening lower extremity edema Lasix 20 mg outpatient that was recently started not help  Severe cardiomyopathy on the echo done bedside and read officially with right-sided the heart failure as well-Left Ventricle: Normal left ventricular wall thickness with enlarged LV cavity size and severely reduced LV systolic function  Estimated ejection fraction 10-15% with severe global hypokinesis  Abnormal diastolic relaxation pattern suggestive of restrictive inflow  · Will place on telemetry monitor  · Troponins are negative no chest pain  · Evaluated by Cardiology medical therapy has been started with aspirin and Toprol Lasix 40 mg IV b i d , started Crestor  · Patient will need cardiac catheterization after diuresis prefers to be transferred to [facility] anticipate Monday  · One thousand five hundred fluid restricted strict I and o and daily weight  · Down to 135 lb leg edema is resolved mild rales at the base continue Lasix transferred to [facility] tomorrow for left heart catheterization  · Give additional magnesium keep greater than to potassium greater than 4 secondary to nonsustained V-tach      NSVT (nonsustained ventricular tachycardia) (HCC)  Assessment & Plan  · Last night 228 beats no recurrence since today in the morning  Potassium is at 4 magnesium below to give additional magnesium IV   Continue Toprol XL unfortunately unable to raise it up secondary to soft blood pressures will need transferred to [facility] tomorrow for left heart catheterization for ischemic workup    Moderate protein-calorie malnutrition ([address] )  Assessment & Plan  Malnutrition Findings:   Adult Malnutrition type: Chronic illness  Adult Degree of Malnutrition: Malnutrition of moderate degree  Malnutrition Characteristics: Muscle loss, Inadequate energy, Weight loss                  360 Statement: Chronic moderate malnutrition related to decreased appetite as evidenced by < 75% energy intake > 1 mo, 5 8% weight loss x 10 days (22 154lb, 22 145lb), moderate muscle loss to temporalis muscles  Treated with: Diet ed completed for sodium restriction, encouraged daily weights  Recommend ensure supplementation in addition to meals at home  Will order ensure compact BID  Will liberalize diet to regular, 2gm Na  Fluid restriction per MD     BMI Findings: Body mass index is 25 58 kg/m²  Tobacco abuse  Assessment & Plan  · Just quit place on nicotine patch        VTE Pharmacologic Prophylaxis: VTE Score: 4 Moderate Risk (Score 3-4) - Pharmacological DVT Prophylaxis Ordered: enoxaparin (Lovenox)  Patient Centered Rounds: I performed bedside rounds with nursing staff today  Discussions with Specialists or Other Care Team Provider: cardiologu yesterday     Education and Discussions with Family / Patient: Updated  (daughter) via phone  Time Spent for Care: 30 minutes  More than 50% of total time spent on counseling and coordination of care as described above  Current Length of Stay: 2 day(s)  Current Patient Status: Inpatient   Certification Statement: The patient will continue to require additional inpatient hospital stay due to chf  Discharge Plan: Anticipate discharge tomorrow to home  gwv    Code Status: Level 1 - Full Code    Subjective:   Seen and examined , sob improved    noc hest pain     Objective:     Vitals:   Temp (24hrs), Av 6 °F (36 4 °C), Min:97 5 °F (36 4 °C), Max:97 7 °F (36 5 °C)    Temp:  [97 5 °F (36 4 °C)-97 7 °F (36 5 °C)] 97 7 °F (36 5 °C)  HR:  [73-85] 80  Resp:  [17-18] 18  BP: ()/(62-72) 106/72  SpO2:  [92 %-96 %] 95 %  Body mass index is 25 58 kg/m²  Input and Output Summary (last 24 hours): Intake/Output Summary (Last 24 hours) at 8/13/2022 1138  Last data filed at 8/13/2022 1001  Gross per 24 hour   Intake 605 ml   Output 2700 ml   Net -2095 ml       Physical Exam:   Physical Exam  Vitals and nursing note reviewed  Constitutional:       General: She is not in acute distress  Appearance: She is well-developed  HENT:      Head: Normocephalic and atraumatic  Eyes:      Conjunctiva/sclera: Conjunctivae normal    Cardiovascular:      Rate and Rhythm: Normal rate and regular rhythm  Heart sounds: No murmur heard  Pulmonary:      Effort: Pulmonary effort is normal  No respiratory distress  Breath sounds: Rales (bibasilar) present  Abdominal:      Palpations: Abdomen is soft  Tenderness: There is no abdominal tenderness  Musculoskeletal:         General: No swelling (patients normal legs)  Cervical back: Neck supple  Skin:     General: Skin is warm and dry  Neurological:      Mental Status: She is alert and oriented to person, place, and time            Additional Data:     Labs:  Results from last 7 days   Lab Units 08/11/22  1924 08/11/22  1459   WBC Thousand/uL  --  6 94   HEMOGLOBIN g/dL  --  12 7   HEMATOCRIT %  --  40 3   PLATELETS Thousands/uL 270 262   NEUTROS PCT %  --  66   LYMPHS PCT %  --  18   MONOS PCT %  --  13*   EOS PCT %  --  2     Results from last 7 days   Lab Units 08/13/22  0622 08/12/22  0505   SODIUM mmol/L 135 136   POTASSIUM mmol/L 4 0 4 2   CHLORIDE mmol/L 100 101   CO2 mmol/L 30 27   BUN mg/dL 22 15   CREATININE mg/dL 0 71 0 67   ANION GAP mmol/L 5 8   CALCIUM mg/dL 8 4 8 1*   ALBUMIN g/dL  --  2 9*   TOTAL BILIRUBIN mg/dL  --  0 80   ALK PHOS U/L  --  72   ALT U/L  --  26   AST U/L  --  19   GLUCOSE RANDOM mg/dL 97 92                       Lines/Drains:  Invasive Devices  Report    Peripheral Intravenous Line  Duration           Peripheral IV 08/13/22 Left;Ventral (anterior) Forearm <1 day                  Telemetry:  Telemetry Orders (From admission, onward)             48 Hour Telemetry Monitoring  Continuous x 48 hours        References:    Telemetry Guidelines   Question:  Reason for 48 Hour Telemetry  Answer:  Arrhythmias Requiring Medical Therapy (eg  SVT, Vtach/fib, Bradycardia, Uncontrolled A-fib)                 Telemetry Reviewed: Normal Sinus Rhythm  Indication for Continued Telemetry Use: Acute CHF on >200 mg lasix/day or equivalent dose or with new reduced EF  Imaging: Reviewed radiology reports from this admission including: chest xray    Recent Cultures (last 7 days):         Last 24 Hours Medication List:   Current Facility-Administered Medications   Medication Dose Route Frequency Provider Last Rate    acetaminophen  650 mg Oral Q4H PRN Steve Malone MD      aspirin  81 mg Oral Daily Regan Cabrera PA-C      enoxaparin  40 mg Subcutaneous Daily Steve Malone MD      furosemide  40 mg Intravenous BID (diuretic) Steve Malone MD      metoprolol succinate  25 mg Oral Daily [place], [place]      midodrine  2 5 mg Oral BID PRN Steve Malone MD      nicotine  14 mg Transdermal Daily Steve Malone MD      potassium chloride  20 mEq Oral Daily [place], [place]      rosuvastatin  10 mg Oral Daily With Yudi Lomeli PA-C          Today, Patient Was Seen By: Steve Malone MD    **Please Note: This note may have been constructed using a voice recognition system  **

## 2022-08-13 NOTE — TRANSPORTATION MEDICAL NECESSITY
Section I - General Information    Name of Patient: Zhen Monson                 : 1950    Medicare #: [id number]  Transport Date: 22 (PCS is valid for round trips on this date and for all repetitive trips in the 60-day range as noted below )  Origin: [address]: [facility]  Is the pt's stay covered under Medicare Part A (PPS/DRG)   []     Closest appropriate facility? If no, why is transport to more distant facility required? Yes  If hospice pt, is this transport related to pt's terminal illness? No       Section II - Medical Necessity Questionnaire  Ambulance transportation is medically necessary only if other means of transport are contraindicated or would be potentially harmful to the patient  To meet this requirement, the patient must either be "bed confined" or suffer from a condition such that transport by means other than ambulance is contraindicated by the patient's condition  The following questions must be answered by the medical professional signing below for this form to be valid:    1)  Describe the [address] [place] [address] Street (physical and/or mental) of this patient AT [address] [place] [address] that requires the patient to be transported in an ambulance and why transport by other means is contraindicated by the patient's condition: Hospital to Hospital transfer- Cardiomyopathy low EF    2) Is the patient "bed confined" as defined below? Yes  To be "be confined" the patient must satisfy all three of the following conditions: (1) unable to get up from bed without Assistance; AND (2) unable to ambulate; AND (3) unable to sit in a chair or wheelchair  3) Can this patient safely be transported by car or wheelchair van (i e , seated during transport without a medical attendant or monitoring)?    No    4) In addition to completing questions 1-3 above, please check any of the following conditions that apply*:   *Note: supporting documentation for any boxes checked must be maintained in the patient's medical records  Medical attendant required   Cardiac monitoring required en route      If hosp-hosp transfer, describe services needed at 2nd facility not available at 1st facility? Services not available at current hospital, needs ischemic work up, cardiac catherization    Section III - Signature of Physician or Healthcare Professional  I certify that the above information is true and correct based on my evaluation of this patient, and represent that the patient requires transport by ambulance and that other forms of transport are contraindicated  I understand that this information will be used by the Centers for Medicare and Medicaid Services (CMS) to support the determination of medical necessity for ambulance services, and I represent that I have personal knowledge of the patient's condition at time of transport  []  If this box is checked, I also certify that the patient is physically or mentally incapable of signing the ambulance service's claim and that the institution with which I am affiliated has furnished care, services, or assistance to the patient  My signature below is made on behalf of the patient pursuant to 42 CFR §424 36(b)(4)  In accordance with 42 CFR §424 37, the specific reason(s) that the patient is physically or mentally incapable of signing the claim form is as follows: Ignacio Dillard of Physician* or Healthcare Professional______________________________________________________________  Signature Date 08/13/22 (For scheduled repetitive transports, this form is not valid for transports performed more than 60 days after this date)    Printed Name & Credentials of Physician or Healthcare Professional (MD, DO, RN, etc )______Deb Francoise Cardenas RN__________________________  *Form must be signed by patient's attending physician for scheduled, repetitive transports   For non-repetitive, unscheduled ambulance transports, if unable to obtain the signature of the attending physician, any of the following may sign (choose appropriate option below)  [] Physician Assistant []  Clinical Nurse Specialist [x]  Registered Nurse  []  Nurse Practitioner  [] Discharge Planner

## 2022-08-13 NOTE — CASE MANAGEMENT
Case Management Discharge Planning Note    Patient name Ignacio Cottrell  Location /-62 MRN [id number]  : 1950 Date 2022       Current Admission Date: 2022  Current Admission Diagnosis:Acute combined systolic and diastolic congestive heart failure [facility])   Patient Active Problem List    Diagnosis Date Noted    Moderate protein-calorie malnutrition ([facility] ) 2022    [facility] ) 2022    Tobacco abuse 2022      LOS (days): 2  Geometric Mean LOS (GMLOS) (days):   Days to GMLOS:     OBJECTIVE:  Risk of Unplanned Readmission Score: 7 65        DISCHARGE DETAILS:  Anticipate transfer to [facility]  possible tomorrow,for ischemic workup, CC  Medical Necessity for transportation was completed and placed in patients folder

## 2022-08-13 NOTE — PLAN OF CARE
Problem: Potential for Falls  Goal: Patient will remain free of falls  Description: INTERVENTIONS:  - Educate patient/family on patient safety including physical limitations  - Instruct patient to call for assistance with activity   - Consult OT/PT to assist with strengthening/mobility   - Keep Call bell within reach  - Keep bed low and locked with side rails adjusted as appropriate  - Keep care items and personal belongings within reach  - Initiate and maintain comfort rounds  - Make Fall Risk Sign visible to staff  - Offer Toileting every 2 Hours, in advance of need  - Initiate/Maintain alarm  - Obtain necessary fall risk management equipment  - Apply yellow socks and bracelet for high fall risk patients  - Consider moving patient to room near nurses station  Outcome: Progressing     Problem: CARDIOVASCULAR - ADULT  Goal: Maintains optimal cardiac output and hemodynamic stability  Description: INTERVENTIONS:  - Monitor I/O, vital signs and rhythm  - Monitor for S/S and trends of decreased cardiac output  - Administer and titrate ordered vasoactive medications to optimize hemodynamic stability  - Assess quality of pulses, skin color and temperature  - Assess for signs of decreased coronary artery perfusion  - Instruct patient to report change in severity of symptoms  Outcome: Progressing     Problem: RESPIRATORY - ADULT  Goal: Achieves optimal ventilation and oxygenation  Description: INTERVENTIONS:  - Assess for changes in respiratory status  - Assess for changes in mentation and behavior  - Position to facilitate oxygenation and minimize respiratory effort  - Oxygen administered by appropriate delivery if ordered  - Initiate smoking cessation education as indicated  - Encourage broncho-pulmonary hygiene including cough, deep breathe, Incentive Spirometry  - Assess the need for suctioning and aspirate as needed  - Assess and instruct to report SOB or any respiratory difficulty  - Respiratory Therapy support as indicated  Outcome: Progressing

## 2022-08-13 NOTE — ASSESSMENT & PLAN NOTE
Wt Readings from Last 3 Encounters:   08/13/22 61 4 kg (135 lb 6 4 oz)   02/11/22 68 9 kg (152 lb)   12/07/20 71 2 kg (157 lb)       · Shortness of breath has been worsening, orthopnea worsening lower extremity edema Lasix 20 mg outpatient that was recently started not help  Severe cardiomyopathy on the echo done bedside and read officially with right-sided the heart failure as well-Left Ventricle: Normal left ventricular wall thickness with enlarged LV cavity size and severely reduced LV systolic function  Estimated ejection fraction 10-15% with severe global hypokinesis  Abnormal diastolic relaxation pattern suggestive of restrictive inflow    · Will place on telemetry monitor  · Troponins are negative no chest pain  · Evaluated by Cardiology medical therapy has been started with aspirin and Toprol Lasix 40 mg IV b i d , started Crestor  · Patient will need cardiac catheterization after diuresis prefers to be transferred to [facility] anticipate Monday  · One thousand five hundred fluid restricted strict I and o and daily weight  · Down to 135 lb leg edema is resolved mild rales at the base continue Lasix transferred to [facility] tomorrow for left heart catheterization  · Give additional magnesium keep greater than to potassium greater than 4 secondary to nonsustained V-tach

## 2022-08-13 NOTE — PLAN OF CARE
Problem: Potential for Falls  Goal: Patient will remain free of falls  Description: INTERVENTIONS:  - Educate patient/family on patient safety including physical limitations  - Instruct patient to call for assistance with activity   - Consult OT/PT to assist with strengthening/mobility   - Keep Call bell within reach  - Keep bed low and locked with side rails adjusted as appropriate  - Keep care items and personal belongings within reach  - Initiate and maintain comfort rounds  - Make Fall Risk Sign visible to staff  - Offer Toileting every 2 Hours, in advance of need  Outcome: Progressing     Problem: PAIN - ADULT  Goal: Verbalizes/displays adequate comfort level or baseline comfort level  Description: Interventions:  - Encourage patient to monitor pain and request assistance  - Assess pain using appropriate pain scale  - Administer analgesics based on type and severity of pain and evaluate response  - Implement non-pharmacological measures as appropriate and evaluate response  - Consider cultural and social influences on pain and pain management  - Notify physician/advanced practitioner if interventions unsuccessful or patient reports new pain  Outcome: Progressing     Problem: INFECTION - ADULT  Goal: Absence or prevention of progression during hospitalization  Description: INTERVENTIONS:  - Assess and monitor for signs and symptoms of infection  - Monitor lab/diagnostic results  - Monitor all insertion sites, i e  indwelling lines, tubes, and drains  - Monitor endotracheal if appropriate and nasal secretions for changes in amount and color  - [place] appropriate cooling/warming therapies per order  - Administer medications as ordered  - Instruct and encourage patient and family to use good hand hygiene technique  - Identify and instruct in appropriate isolation precautions for identified infection/condition  Outcome: Progressing     Problem: SAFETY ADULT  Goal: Patient will remain free of falls  Description: INTERVENTIONS:  - Educate patient/family on patient safety including physical limitations  - Instruct patient to call for assistance with activity   - Consult OT/PT to assist with strengthening/mobility   - Keep Call bell within reach  - Keep bed low and locked with side rails adjusted as appropriate  - Keep care items and personal belongings within reach  - Initiate and maintain comfort rounds  - Make Fall Risk Sign visible to staff  - Offer Toileting every 2 Hours, in advance of need  Outcome: Progressing  Goal: Maintain or return to baseline ADL function  Description: INTERVENTIONS:  - Educate patient/family on patient safety including physical limitations  - Instruct patient to call for assistance with activity   - Consult OT/PT to assist with strengthening/mobility   - Keep Call bell within reach  - Keep bed low and locked with side rails adjusted as appropriate  - Keep care items and personal belongings within reach  - Initiate and maintain comfort rounds  - Make Fall Risk Sign visible to staff  - Offer Toileting every 2 Hours, in advance of need  Outcome: Progressing  Goal: Maintains/Returns to pre admission functional level  Description: INTERVENTIONS:  - Perform BMAT or MOVE assessment daily    - Set and communicate daily mobility goal to care team and patient/family/caregiver     - Collaborate with rehabilitation services on mobility goals if consulted  - Out of bed for toileting  - Record patient progress and toleration of activity level   Outcome: Progressing     Problem: DISCHARGE PLANNING  Goal: Discharge to home or other facility with appropriate resources  Description: INTERVENTIONS:  - Identify barriers to discharge w/patient and caregiver  - Arrange for needed discharge resources and transportation as appropriate  - Identify discharge learning needs (meds, wound care, etc )  - Arrange for interpretive services to assist at discharge as needed  - Refer to Case Management Department for coordinating discharge planning if the patient needs post-hospital services based on physician/advanced practitioner order or complex needs related to functional status, cognitive ability, or social support system  Outcome: Progressing     Problem: Knowledge Deficit  Goal: Patient/family/caregiver demonstrates understanding of disease process, treatment plan, medications, and discharge instructions  Description: Complete learning assessment and assess knowledge base    Interventions:  - Provide teaching at level of understanding  - Provide teaching via preferred learning methods  Outcome: Progressing     Problem: CARDIOVASCULAR - ADULT  Goal: Maintains optimal cardiac output and hemodynamic stability  Description: INTERVENTIONS:  - Monitor I/O, vital signs and rhythm  - Monitor for S/S and trends of decreased cardiac output  - Administer and titrate ordered vasoactive medications to optimize hemodynamic stability  - Assess quality of pulses, skin color and temperature  - Assess for signs of decreased coronary artery perfusion  - Instruct patient to report change in severity of symptoms  Outcome: Progressing  Goal: Absence of cardiac dysrhythmias or at baseline rhythm  Description: INTERVENTIONS:  - Continuous cardiac monitoring, vital signs, obtain 12 lead EKG if ordered  - Administer antiarrhythmic and heart rate control medications as ordered  - Monitor electrolytes and administer replacement therapy as ordered  Outcome: Progressing     Problem: RESPIRATORY - ADULT  Goal: Achieves optimal ventilation and oxygenation  Description: INTERVENTIONS:  - Assess for changes in respiratory status  - Assess for changes in mentation and behavior  - Position to facilitate oxygenation and minimize respiratory effort  - Oxygen administered by appropriate delivery if ordered  - Initiate smoking cessation education as indicated  - Encourage broncho-pulmonary hygiene including cough, deep breathe, Incentive Spirometry  - Assess the need for suctioning and aspirate as needed  - Assess and instruct to report SOB or any respiratory difficulty  - Respiratory Therapy support as indicated  Outcome: Progressing     Problem: METABOLIC, FLUID AND ELECTROLYTES - ADULT  Goal: Electrolytes maintained within normal limits  Description: INTERVENTIONS:  - Monitor labs and assess patient for signs and symptoms of electrolyte imbalances  - Administer electrolyte replacement as ordered  - Monitor response to electrolyte replacements, including repeat lab results as appropriate  - Instruct patient on fluid and nutrition as appropriate  Outcome: Progressing  Goal: Fluid balance maintained  Description: INTERVENTIONS:  - Monitor labs   - Monitor I/O and WT  - Instruct patient on fluid and nutrition as appropriate  - Assess for signs & symptoms of volume excess or deficit  Outcome: Progressing     Problem: MOBILITY - ADULT  Goal: Maintain or return to baseline ADL function  Description: INTERVENTIONS:  - Educate patient/family on patient safety including physical limitations  - Instruct patient to call for assistance with activity   - Consult OT/PT to assist with strengthening/mobility   - Keep Call bell within reach  - Keep bed low and locked with side rails adjusted as appropriate  - Keep care items and personal belongings within reach  - Initiate and maintain comfort rounds  - Make Fall Risk Sign visible to staff  - Offer Toileting every 2 Hours, in advance of need  Outcome: Progressing  Goal: Maintains/Returns to pre admission functional level  Description: INTERVENTIONS:  - Perform BMAT or MOVE assessment daily    - Set and communicate daily mobility goal to care team and patient/family/caregiver     - Collaborate with rehabilitation services on mobility goals if consulted  - Out of bed for toileting  - Record patient progress and toleration of activity level   Outcome: Progressing

## 2022-08-13 NOTE — ASSESSMENT & PLAN NOTE
Malnutrition Findings:   Adult Malnutrition type: Chronic illness  Adult Degree of Malnutrition: Malnutrition of moderate degree  Malnutrition Characteristics: Muscle loss, Inadequate energy, Weight loss                  360 Statement: Chronic moderate malnutrition related to decreased appetite as evidenced by < 75% energy intake > 1 mo, 5 8% weight loss x 10 days (8/1/22 154lb, 8/11/22 145lb), moderate muscle loss to temporalis muscles  Treated with: Diet ed completed for sodium restriction, encouraged daily weights  Recommend ensure supplementation in addition to meals at home  Will order ensure compact BID  Will liberalize diet to regular, 2gm Na  Fluid restriction per MD     BMI Findings: Body mass index is 25 58 kg/m²

## 2022-08-13 NOTE — ASSESSMENT & PLAN NOTE
· Last night 228 beats no recurrence since today in the morning  Potassium is at 4 magnesium below to give additional magnesium IV   Continue Toprol XL unfortunately unable to raise it up secondary to soft blood pressures will need transferred to [facility] tomorrow for left heart catheterization for ischemic workup

## 2022-08-14 VITALS
SYSTOLIC BLOOD PRESSURE: 99 MMHG | OXYGEN SATURATION: 96 % | HEIGHT: 61 IN | BODY MASS INDEX: 25.75 KG/M2 | DIASTOLIC BLOOD PRESSURE: 65 MMHG | TEMPERATURE: 97.6 F | WEIGHT: 136.4 LBS | RESPIRATION RATE: 18 BRPM | HEART RATE: 78 BPM

## 2022-08-14 LAB
ANION GAP SERPL CALCULATED.3IONS-SCNC: 6 MMOL/L (ref 4–13)
BUN SERPL-MCNC: 21 MG/DL (ref 5–25)
CALCIUM SERPL-MCNC: 8.2 MG/DL (ref 8.3–10.1)
CHLORIDE SERPL-SCNC: 101 MMOL/L (ref 96–108)
CO2 SERPL-SCNC: 29 MMOL/L (ref 21–32)
CREAT SERPL-MCNC: 0.76 MG/DL (ref 0.6–1.3)
GFR SERPL CREATININE-BSD FRML MDRD: 79 ML/MIN/1.73SQ M
GLUCOSE SERPL-MCNC: 90 MG/DL (ref 65–140)
POTASSIUM SERPL-SCNC: 4.5 MMOL/L (ref 3.5–5.3)
SARS-COV-2 RNA RESP QL NAA+PROBE: POSITIVE
SODIUM SERPL-SCNC: 136 MMOL/L (ref 135–147)

## 2022-08-14 PROCEDURE — U0003 INFECTIOUS AGENT DETECTION BY NUCLEIC ACID (DNA OR RNA); SEVERE ACUTE RESPIRATORY SYNDROME CORONAVIRUS 2 (SARS-COV-2) (CORONAVIRUS DISEASE [COVID-19]), AMPLIFIED PROBE TECHNIQUE, MAKING USE OF HIGH THROUGHPUT TECHNOLOGIES AS DESCRIBED BY CMS-2020-01-R: HCPCS | Performed by: FAMILY MEDICINE

## 2022-08-14 PROCEDURE — 80048 BASIC METABOLIC PNL TOTAL CA: CPT | Performed by: FAMILY MEDICINE

## 2022-08-14 PROCEDURE — 99239 HOSP IP/OBS DSCHRG MGMT >30: CPT | Performed by: FAMILY MEDICINE

## 2022-08-14 PROCEDURE — U0005 INFEC AGEN DETEC AMPLI PROBE: HCPCS | Performed by: FAMILY MEDICINE

## 2022-08-14 RX ORDER — METOPROLOL SUCCINATE 25 MG/1
25 TABLET, EXTENDED RELEASE ORAL DAILY
Refills: 0
Start: 2022-08-15

## 2022-08-14 RX ORDER — ROSUVASTATIN CALCIUM 10 MG/1
10 TABLET, COATED ORAL
Refills: 0
Start: 2022-08-14

## 2022-08-14 RX ORDER — ASPIRIN 81 MG/1
81 TABLET ORAL DAILY
Refills: 0
Start: 2022-08-15

## 2022-08-14 RX ORDER — FUROSEMIDE 20 MG/1
20 TABLET ORAL DAILY
Status: DISCONTINUED | OUTPATIENT
Start: 2022-08-14 | End: 2022-08-14 | Stop reason: HOSPADM

## 2022-08-14 RX ORDER — ALPRAZOLAM 0.25 MG/1
0.25 TABLET ORAL DAILY PRN
Status: DISCONTINUED | OUTPATIENT
Start: 2022-08-14 | End: 2022-08-14 | Stop reason: HOSPADM

## 2022-08-14 RX ORDER — FUROSEMIDE 20 MG/1
20 TABLET ORAL DAILY
Refills: 0
Start: 2022-08-14

## 2022-08-14 RX ADMIN — POTASSIUM CHLORIDE 20 MEQ: 1500 TABLET, EXTENDED RELEASE ORAL at 08:58

## 2022-08-14 RX ADMIN — ALPRAZOLAM 0.25 MG: 0.25 TABLET ORAL at 17:04

## 2022-08-14 RX ADMIN — ENOXAPARIN SODIUM 40 MG: 40 INJECTION SUBCUTANEOUS at 09:01

## 2022-08-14 RX ADMIN — ROSUVASTATIN CALCIUM 10 MG: 10 TABLET, FILM COATED ORAL at 17:04

## 2022-08-14 RX ADMIN — ASPIRIN 81 MG: 81 TABLET, COATED ORAL at 08:58

## 2022-08-14 NOTE — ASSESSMENT & PLAN NOTE
Malnutrition Findings:   Adult Malnutrition type: Chronic illness  Adult Degree of Malnutrition: Malnutrition of moderate degree  Malnutrition Characteristics: Muscle loss, Inadequate energy, Weight loss                  360 Statement: Chronic moderate malnutrition related to decreased appetite as evidenced by < 75% energy intake > 1 mo, 5 8% weight loss x 10 days (8/1/22 154lb, 8/11/22 145lb), moderate muscle loss to temporalis muscles  Treated with: Diet ed completed for sodium restriction, encouraged daily weights  Recommend ensure supplementation in addition to meals at home  Will order ensure compact BID  Will liberalize diet to regular, 2gm Na  Fluid restriction per MD     BMI Findings: Body mass index is 25 77 kg/m²

## 2022-08-14 NOTE — EMTALA/ACUTE CARE TRANSFER
[address] [facility] [address]  [address] [facility] [address]  Dept: [phone]      ACUTE CARE TRANSFER CONSENT    NAME Arletta Bloch DOB 1950                              MRN [id number]    I have been informed of my rights regarding examination, treatment, and transfer   by Dr Sandrine Rehman MD    Benefits:      Risks:        Consent for Transfer:  I acknowledge that my medical condition has been evaluated and explained to me by the treating physician or other qualified medical person and/or my attending physician, who has recommended that I be transferred to the service of    at    The above potential benefits of such transfer, the potential risks associated with such transfer, and the probable risks of not being transferred have been explained to me, and I fully understand them  The doctor has explained that, in my case, the benefits of transfer outweigh the risks  I agree to be transferred  I authorize the performance of emergency medical procedures and treatments upon me in both transit and upon arrival at the receiving facility  Additionally, I authorize the release of any and all medical records to the receiving facility and request they be transported with me, if possible  I understand that the safest mode of transportation during a medical emergency is an ambulance and that the Hospital advocates the use of this mode of transport  Risks of traveling to the receiving facility by car, including absence of medical control, life sustaining equipment, such as oxygen, and medical personnel has been explained to me and I fully understand them  (ROSELYN CORRECT BOX BELOW)  [  ]  I consent to the stated transfer and to be transported by ambulance/helicopter  [  ]  I consent to the stated transfer, but refuse transportation by ambulance and accept full responsibility for my transportation by car    I understand the risks of non-ambulance transfers and I exonerate the Hospital and its staff from any deterioration in my condition that results from this refusal     X___________________________________________    DATE  22  TIME________  Signature of patient or legally responsible individual signing on patient behalf           RELATIONSHIP TO PATIENT_________________________          Provider [address] [place] [address] 1950                              MRN [id number]    A medical screening exam was performed on the above named patient  Based on the examination:    Condition Necessitating Transfer cardiac catheterization    Patient Condition:      Reason for Transfer:      Transfer Requirements: Facility     · Space available and qualified personnel available for treatment as acknowledged by    · Agreed to accept transfer and to provide appropriate medical treatment as acknowledged by          · Appropriate medical records of the examination and treatment of the patient are provided at the time of transfer   [address] [place] [address] [address] _______  · Transfer will be performed by qualified personnel from    and appropriate transfer equipment as required, including the use of necessary and appropriate life support measures      Provider Certification: I have examined the patient and explained the following risks and benefits of being transferred/refusing transfer to the patient/family:         Based on these reasonable risks and benefits to the patient and/or the unborn child(rosemarie), and based upon the information available at the time of the patients examination, I certify that the medical benefits reasonably to be expected from the provision of appropriate medical treatments at another medical facility outweigh the increasing risks, if any, to the individuals medical condition, and in the case of labor to the unborn child, from effecting the transfer      X____________________________________________ DATE 08/14/22        TIME_______      ORIGINAL - SEND TO MEDICAL RECORDS   COPY - SEND WITH PATIENT DURING TRANSFER

## 2022-08-14 NOTE — ASSESSMENT & PLAN NOTE
· Last night 228 beats no recurrence since today in the morning  Potassium is at 4 magnesium below to give additional magnesium IV   Continue Toprol XL unfortunately unable to raise it up secondary to soft blood pressures will need transferred to [facility] tomorrow for left heart catheterization for ischemic workup    No recurrence since 8/12

## 2022-08-14 NOTE — QUICK NOTE
Patient covid positive on incidental swab today for transfer - none done on admission   Most most likely COVID positive on admission    Patient asymptomatic  Daughter In room made aware

## 2022-08-14 NOTE — PLAN OF CARE
Problem: Potential for Falls  Goal: Patient will remain free of falls  Description: INTERVENTIONS:  - Educate patient/family on patient safety including physical limitations  - Instruct patient to call for assistance with activity   - Consult OT/PT to assist with strengthening/mobility   - Keep Call bell within reach  - Keep bed low and locked with side rails adjusted as appropriate  - Keep care items and personal belongings within reach  - Initiate and maintain comfort rounds  - Make Fall Risk Sign visible to staff  - Offer Toileting every 2 Hours, in advance of need  Outcome: Progressing     Problem: CARDIOVASCULAR - ADULT  Goal: Maintains optimal cardiac output and hemodynamic stability  Description: INTERVENTIONS:  - Monitor I/O, vital signs and rhythm  - Monitor for S/S and trends of decreased cardiac output  - Administer and titrate ordered vasoactive medications to optimize hemodynamic stability  - Assess quality of pulses, skin color and temperature  - Assess for signs of decreased coronary artery perfusion  - Instruct patient to report change in severity of symptoms  Outcome: Progressing     Problem: RESPIRATORY - ADULT  Goal: Achieves optimal ventilation and oxygenation  Description: INTERVENTIONS:  - Assess for changes in respiratory status  - Assess for changes in mentation and behavior  - Position to facilitate oxygenation and minimize respiratory effort  - Oxygen administered by appropriate delivery if ordered  - Initiate smoking cessation education as indicated  - Encourage broncho-pulmonary hygiene including cough, deep breathe, Incentive Spirometry  - Assess the need for suctioning and aspirate as needed  - Assess and instruct to report SOB or any respiratory difficulty  - Respiratory Therapy support as indicated  Outcome: Progressing

## 2022-08-14 NOTE — DISCHARGE SUMMARY
[address]  Discharge- Ting Delcid 1950, 81 y o  female MRN: [id number]  Unit/Bed#: -01 Encounter: [id number]  Primary Care Provider: Luke Cortés PA-C   Date and time admitted to hospital: 8/11/2022  2:44 PM    * Acute combined systolic and diastolic congestive heart failure ([facility] )  Assessment & Plan  Wt Readings from Last 3 Encounters:   08/14/22 61 9 kg (136 lb 6 4 oz)   02/11/22 68 9 kg (152 lb)   12/07/20 71 2 kg (157 lb)       · Shortness of breath has been worsening, orthopnea worsening lower extremity edema Lasix 20 mg outpatient that was recently started not help  Severe cardiomyopathy on the echo done bedside and read officially with right-sided the heart failure as well-Left Ventricle: Normal left ventricular wall thickness with enlarged LV cavity size and severely reduced LV systolic function  Estimated ejection fraction 10-15% with severe global hypokinesis  Abnormal diastolic relaxation pattern suggestive of restrictive inflow  · Will place on telemetry monitor  · Troponins are negative no chest pain  · Diuresed 10 lb -3 L her lungs are clear her leg edema is gone she has baseline trace  Blood pressure is on the low side discontinue Lasix IV placed on Lasix 20 mg daily continue aspirin Crestor and metoprolol  · Patient will need cardiac catheterization after diuresis prefers to be transferred to [facility] anticipate Monday  · Will be transferred to [facility] today for left-sided catheterization to be done tomorrow spoken to the hospital so [facility] accepted Dr Roxene Najjar       NSVT (nonsustained ventricular tachycardia) ([facility] )  Assessment & Plan  · Last night 228 beats no recurrence since today in the morning  Potassium is at 4 magnesium below to give additional magnesium IV   Continue Toprol XL unfortunately unable to raise it up secondary to soft blood pressures will need transferred to [facility] tomorrow for left heart catheterization for ischemic workup    No recurrence since 8/12    Moderate protein-calorie malnutrition (HCC)  Assessment & Plan  Malnutrition Findings:   Adult Malnutrition type: Chronic illness  Adult Degree of Malnutrition: Malnutrition of moderate degree  Malnutrition Characteristics: Muscle loss, Inadequate energy, Weight loss                  360 Statement: Chronic moderate malnutrition related to decreased appetite as evidenced by < 75% energy intake > 1 mo, 5 8% weight loss x 10 days (8/1/22 154lb, 8/11/22 145lb), moderate muscle loss to temporalis muscles  Treated with: Diet ed completed for sodium restriction, encouraged daily weights  Recommend ensure supplementation in addition to meals at home  Will order ensure compact BID  Will liberalize diet to regular, 2gm Na  Fluid restriction per MD     BMI Findings: Body mass index is 25 77 kg/m²  Tobacco abuse  Assessment & Plan  · Just quit place on nicotine patch      Medical Problems             Resolved Problems  Date Reviewed: 8/14/2022   None               Discharging Physician / Practitioner: Amber Adair MD  PCP: Vijaya Oconnor PA-C  Admission Date:   Admission Orders (From admission, onward)     Ordered        08/11/22 [address]  Once                      Discharge Date: 08/14/22    Consultations During Hospital Stay:  · Cardiology    Procedures Performed:   · None    Significant Findings / Test Results:   · A chest x-ray is normal  · CTA PE study negative for pulmonary emboli  Mild emphysema  Mild interstitial edema trace right effusion  · Venous duplex is negative  · 2D echo- EF is 10-15% with severe global hypokinesis  Abnormal diastolic relaxation pattern suggestive of restrictive flow  Right ventricle size is reduced  Moderate mitral regurg    Incidental Findings:   · None     Test Results Pending at Discharge (will require follow up):    · None     Outpatient Tests Requested:  · None    Complications:  None    Reason for Admission:  Shortness of breath    Hospital Course: King Purvis is a 81 y o  female patient who originally presented to the hospital on 8/11/2022 due to she new onset cardiomyopathy with acute on chronic systolic diastolic heart failure exacerbation  Started on IV diuresis and medical management for cardiomyopathy diuresed 10 lb with the interstitial edema resolution leg edema resolution placed on Lasix 20 mg daily now she does have soft blood pressures not sure how she is going to handle Ace or an Arb or will be a candidate for [facility]  Did have episodes of nonsustained V-tach which resolved with supplementation of potassium and magnesium  She was started on aspirin Crestor and Toprol XL and she is being transferred to [facility] for left heart catheterization accepted by Dr Gillian Patel         Please see above list of diagnoses and related plan for additional information  Condition at Discharge: stable    Discharge Day Visit / Exam:   Subjective:  Seen and examined sob improved and no cp   Vitals: Blood Pressure: 92/55 (08/14/22 0854)  Pulse: 78 (08/14/22 0854)  Temperature: 97 5 °F (36 4 °C) (08/14/22 0743)  Temp Source: Oral (08/13/22 1846)  Respirations: 18 (08/14/22 0854)  Height: 5' 1" (154 9 cm) (08/12/22 1045)  Weight - Scale: 61 9 kg (136 lb 6 4 oz) (08/14/22 0558)  SpO2: 95 % (08/14/22 0854)  Exam:   Physical Exam  Vitals and nursing note reviewed  Constitutional:       General: She is not in acute distress  Appearance: She is well-developed  HENT:      Head: Normocephalic and atraumatic  Eyes:      Conjunctiva/sclera: Conjunctivae normal    Cardiovascular:      Rate and Rhythm: Normal rate and regular rhythm  Heart sounds: No murmur heard  Pulmonary:      Effort: Pulmonary effort is normal  No respiratory distress  Breath sounds: Normal breath sounds  No wheezing or rales  Abdominal:      General: There is no distension  Palpations: Abdomen is soft  Tenderness: There is no abdominal tenderness  Musculoskeletal:         General: Swelling (this is his normal legs - all water edema is resolved- has some trave above the socks ) present  Cervical back: Neck supple  Skin:     General: Skin is warm and dry  Neurological:      Mental Status: She is alert and oriented to person, place, and time  Discussion with Family: Updated  (daughter) via phone  Discharge instructions/Information to patient and family:   See after visit summary for information provided to patient and family  Provisions for Follow-Up Care:  See after visit summary for information related to follow-up care and any pertinent home health orders  Disposition:   [address] Transfer to [facility]    Planned Readmission: no     Discharge Statement:  I spent >35 minutes discharging the patient  This time was spent on the day of discharge  I had direct contact with the patient on the day of discharge  Greater than 50% of the total time was spent examining patient, answering all patient questions, arranging and discussing plan of care with patient as well as directly providing post-discharge instructions  Additional time then spent on discharge activities  Discharge Medications:  See after visit summary for reconciled discharge medications provided to patient and/or family        **Please Note: This note may have been constructed using a voice recognition system**

## 2022-08-14 NOTE — ASSESSMENT & PLAN NOTE
Wt Readings from Last 3 Encounters:   08/14/22 61 9 kg (136 lb 6 4 oz)   02/11/22 68 9 kg (152 lb)   12/07/20 71 2 kg (157 lb)       · Shortness of breath has been worsening, orthopnea worsening lower extremity edema Lasix 20 mg outpatient that was recently started not help  Severe cardiomyopathy on the echo done bedside and read officially with right-sided the heart failure as well-Left Ventricle: Normal left ventricular wall thickness with enlarged LV cavity size and severely reduced LV systolic function  Estimated ejection fraction 10-15% with severe global hypokinesis  Abnormal diastolic relaxation pattern suggestive of restrictive inflow  · Will place on telemetry monitor  · Troponins are negative no chest pain  · Diuresed 10 lb -3 L her lungs are clear her leg edema is gone she has baseline trace    Blood pressure is on the low side discontinue Lasix IV placed on Lasix 20 mg daily continue aspirin Crestor and metoprolol  · Patient will need cardiac catheterization after diuresis prefers to be transferred to [facility] anticipate Monday  · Will be transferred to [facility] today for left-sided catheterization to be done tomorrow spoken to the hospital so [facility] accepted Dr Leda Ch

## 2022-08-15 NOTE — UTILIZATION REVIEW
Notification of Discharge   This is a Notification of Discharge from our facility [address]  Please be advised that this patient has been discharge from our facility  Below you will find the admission and discharge date and time including the patients disposition  UTILIZATION REVIEW CONTACT:  [address]  Utilization   Network Utilization Review Department  Phone: [phone] x carefully listen to the prompts  All voicemails are confidential   Email: [email or web address]     PHYSICIAN ADVISORY SERVICES:  FOR [id number] REVIEW - MEDICAL NECESSITY DENIAL  Phone: [phone]  Fax: [phone]  Email: [email or web address]     PRESENTATION DATE: 8/11/2022  2:44 PM  OBERVATION ADMISSION DATE:   INPATIENT ADMISSION DATE: 8/11/22  3:45 PM   DISCHARGE DATE: 8/14/2022  5:45 PM  DISPOSITION: Non [facility] Non [facility]      IMPORTANT INFORMATION:  Send all requests for admission clinical reviews, approved or denied determinations and any other requests to dedicated fax number below belonging to the campus where the patient is receiving treatment   List of dedicated fax numbers:  [address] DENIALS (Administrative/Medical Necessity) [phone]   [address] (Maternity/NICU/Pediatrics) [phone]   Donavan Stephens [phone]   [address] [facility] [phone]   [address] [place] [address] [phone]   [address] [phone]   [facility]  [phone]   [address] [place] [address], [place] campus  [address] [phone]

## 2022-08-29 ENCOUNTER — HOSPITAL ENCOUNTER (OUTPATIENT)
Dept: CT IMAGING | Facility: HOSPITAL | Age: 72
Discharge: HOME | End: 2022-08-29
Payer: COMMERCIAL

## 2022-08-29 DIAGNOSIS — E27.8 OTHER SPECIFIED DISORDERS OF ADRENAL GLAND (HCC): ICD-10-CM

## 2022-08-29 PROCEDURE — 74170 CT ABD WO CNTRST FLWD CNTRST: CPT

## 2022-08-29 RX ADMIN — IOHEXOL 85 ML: 350 INJECTION, SOLUTION INTRAVENOUS at 10:13

## 2022-11-17 ENCOUNTER — HOSPITAL ENCOUNTER (OUTPATIENT)
Dept: NON INVASIVE DIAGNOSTICS | Facility: HOSPITAL | Age: 72
Discharge: HOME | End: 2022-11-17

## 2022-11-17 VITALS
BODY MASS INDEX: 25.68 KG/M2 | HEIGHT: 61 IN | SYSTOLIC BLOOD PRESSURE: 112 MMHG | DIASTOLIC BLOOD PRESSURE: 72 MMHG | WEIGHT: 136 LBS | HEART RATE: 65 BPM

## 2022-11-17 DIAGNOSIS — I42.8 OTHER CARDIOMYOPATHIES (HCC): ICD-10-CM

## 2022-11-17 DIAGNOSIS — I50.1 LEFT VENTRICULAR FAILURE, UNSPECIFIED (HCC): ICD-10-CM

## 2022-11-17 DIAGNOSIS — I50.41 ACUTE COMBINED SYSTOLIC (CONGESTIVE) AND DIASTOLIC (CONGESTIVE) HEART FAILURE (HCC): ICD-10-CM

## 2022-11-17 DIAGNOSIS — J43.2 CENTRILOBULAR EMPHYSEMA (HCC): ICD-10-CM

## 2022-11-17 LAB
AORTIC ROOT: 3.8 CM
APICAL FOUR CHAMBER EJECTION FRACTION: 45 %
E WAVE DECELERATION TIME: 158 MS
FRACTIONAL SHORTENING: 20 % (ref 28–44)
INTERVENTRICULAR SEPTUM IN DIASTOLE (PARASTERNAL SHORT AXIS VIEW): 1 CM
INTERVENTRICULAR SEPTUM: 1 CM (ref 0.6–1.1)
LEFT ATRIUM SIZE: 2.5 CM
LEFT INTERNAL DIMENSION IN SYSTOLE: 3.6 CM (ref 2.1–4)
LEFT VENTRICLE DIASTOLIC VOLUME (MOD BIPLANE): 124 ML
LEFT VENTRICLE SYSTOLIC VOLUME (MOD BIPLANE): 66 ML
LEFT VENTRICULAR INTERNAL DIMENSION IN DIASTOLE: 4.5 CM (ref 3.5–6)
LEFT VENTRICULAR POSTERIOR WALL IN END DIASTOLE: 0.9 CM
LEFT VENTRICULAR STROKE VOLUME: 38 ML
LV EF: 47 %
LVSV (TEICH): 38 ML
MV E'TISSUE VEL-LAT: 7 CM/S
MV E'TISSUE VEL-SEP: 5 CM/S
MV PEAK A VEL: 0.96 M/S
MV PEAK E VEL: 60 CM/S
MV STENOSIS PRESSURE HALF TIME: 46 MS
MV VALVE AREA P 1/2 METHOD: 4.78 CM2
SL CV PED ECHO LEFT VENTRICLE DIASTOLIC VOLUME (MOD BIPLANE) 2D: 91 ML
SL CV PED ECHO LEFT VENTRICLE SYSTOLIC VOLUME (MOD BIPLANE) 2D: 53 ML

## 2023-03-23 ENCOUNTER — HOSPITAL ENCOUNTER (OUTPATIENT)
Dept: RADIOLOGY | Facility: CLINIC | Age: 73
End: 2023-03-23

## 2023-03-23 VITALS — BODY MASS INDEX: 31.61 KG/M2 | WEIGHT: 161 LBS | HEIGHT: 60 IN

## 2023-03-23 DIAGNOSIS — Z12.31 ENCOUNTER FOR SCREENING MAMMOGRAM FOR MALIGNANT NEOPLASM OF BREAST: ICD-10-CM

## 2024-03-29 ENCOUNTER — HOSPITAL ENCOUNTER (OUTPATIENT)
Dept: RADIOLOGY | Facility: CLINIC | Age: 74
End: 2024-03-29
Payer: COMMERCIAL

## 2024-03-29 VITALS — BODY MASS INDEX: 36.32 KG/M2 | HEIGHT: 60 IN | WEIGHT: 185 LBS

## 2024-03-29 DIAGNOSIS — Z12.31 ENCOUNTER FOR SCREENING MAMMOGRAM FOR MALIGNANT NEOPLASM OF BREAST: ICD-10-CM

## 2024-03-29 PROCEDURE — 77063 BREAST TOMOSYNTHESIS BI: CPT

## 2024-03-29 PROCEDURE — 77067 SCR MAMMO BI INCL CAD: CPT

## 2025-02-07 DIAGNOSIS — D35.02 BENIGN NEOPLASM OF LEFT ADRENAL GLAND: ICD-10-CM

## 2025-04-01 ENCOUNTER — HOSPITAL ENCOUNTER (OUTPATIENT)
Dept: RADIOLOGY | Facility: CLINIC | Age: 75
Discharge: HOME | End: 2025-04-01
Payer: COMMERCIAL

## 2025-04-01 ENCOUNTER — HOSPITAL ENCOUNTER (OUTPATIENT)
Dept: NON INVASIVE DIAGNOSTICS | Facility: HOSPITAL | Age: 75
Discharge: HOME | End: 2025-04-01
Payer: COMMERCIAL

## 2025-04-01 VITALS — HEIGHT: 60 IN | BODY MASS INDEX: 35.34 KG/M2 | WEIGHT: 180 LBS

## 2025-04-01 VITALS
HEART RATE: 64 BPM | DIASTOLIC BLOOD PRESSURE: 72 MMHG | BODY MASS INDEX: 35.34 KG/M2 | HEIGHT: 60 IN | WEIGHT: 180 LBS | SYSTOLIC BLOOD PRESSURE: 112 MMHG

## 2025-04-01 DIAGNOSIS — I50.41 ACUTE COMBINED SYSTOLIC (CONGESTIVE) AND DIASTOLIC (CONGESTIVE) HEART FAILURE (HCC): ICD-10-CM

## 2025-04-01 DIAGNOSIS — Z12.31 ENCOUNTER FOR SCREENING MAMMOGRAM FOR MALIGNANT NEOPLASM OF BREAST: ICD-10-CM

## 2025-04-01 LAB
AORTIC ROOT: 3.2 CM
ASCENDING AORTA: 3.2 CM
BSA FOR ECHO PROCEDURE: 1.78 M2
E WAVE DECELERATION TIME: 253 MS
E/A RATIO: 0.78
FRACTIONAL SHORTENING: 27 (ref 28–44)
INTERVENTRICULAR SEPTUM IN DIASTOLE (PARASTERNAL SHORT AXIS VIEW): 0.8 CM
INTERVENTRICULAR SEPTUM: 0.8 CM (ref 0.6–1.1)
IVC: 11 MM
LAAS-AP2: 16.5 CM2
LAAS-AP4: 15.2 CM2
LEFT ATRIUM SIZE: 2.9 CM
LEFT ATRIUM VOLUME (MOD BIPLANE): 39 ML
LEFT ATRIUM VOLUME INDEX (MOD BIPLANE): 21.9 ML/M2
LEFT INTERNAL DIMENSION IN SYSTOLE: 3.2 CM (ref 2.1–4)
LEFT VENTRICLE DIASTOLIC VOLUME (MOD BIPLANE): 57 ML
LEFT VENTRICLE DIASTOLIC VOLUME INDEX (MOD BIPLANE): 32 ML/M2
LEFT VENTRICLE SYSTOLIC VOLUME (MOD BIPLANE): 22 ML
LEFT VENTRICLE SYSTOLIC VOLUME INDEX (MOD BIPLANE): 12.4 ML/M2
LEFT VENTRICULAR INTERNAL DIMENSION IN DIASTOLE: 4.4 CM (ref 3.5–6)
LEFT VENTRICULAR POSTERIOR WALL IN END DIASTOLE: 0.8 CM
LEFT VENTRICULAR STROKE VOLUME: 46 ML
LV EF BIPLANE MOD: 62 %
LV EF US.2D.A4C+ESTIMATED: 68 %
LVSV (TEICH): 46 ML
MV E'TISSUE VEL-LAT: 8 CM/S
MV E'TISSUE VEL-SEP: 5 CM/S
MV PEAK A VEL: 0.94 M/S
MV PEAK E VEL: 73 CM/S
MV STENOSIS PRESSURE HALF TIME: 73 MS
MV VALVE AREA P 1/2 METHOD: 3.01
RA PRESSURE ESTIMATED: 3 MMHG
RIGHT ATRIUM AREA SYSTOLE A4C: 9.2 CM2
RIGHT VENTRICLE ID DIMENSION: 2.8 CM
RV PSP: 16 MMHG
SL CV LEFT ATRIUM LENGTH A2C: 5.5 CM
SL CV PED ECHO LEFT VENTRICLE DIASTOLIC VOLUME (MOD BIPLANE) 2D: 86 ML
SL CV PED ECHO LEFT VENTRICLE SYSTOLIC VOLUME (MOD BIPLANE) 2D: 40 ML
TR MAX PG: 13 MMHG
TR PEAK VELOCITY: 1.8 M/S
TRICUSPID ANNULAR PLANE SYSTOLIC EXCURSION: 1.8 CM
TRICUSPID VALVE PEAK REGURGITATION VELOCITY: 1.84 M/S

## 2025-04-01 PROCEDURE — 77067 SCR MAMMO BI INCL CAD: CPT

## 2025-04-01 PROCEDURE — 77063 BREAST TOMOSYNTHESIS BI: CPT

## 2025-04-01 PROCEDURE — 93306 TTE W/DOPPLER COMPLETE: CPT

## 2025-04-03 DIAGNOSIS — Z12.31 ENCOUNTER FOR SCREENING MAMMOGRAM FOR MALIGNANT NEOPLASM OF BREAST: ICD-10-CM
